# Patient Record
Sex: FEMALE | URBAN - METROPOLITAN AREA
[De-identification: names, ages, dates, MRNs, and addresses within clinical notes are randomized per-mention and may not be internally consistent; named-entity substitution may affect disease eponyms.]

---

## 2024-05-07 ENCOUNTER — INPATIENT (INPATIENT)
Facility: HOSPITAL | Age: 64
LOS: 1 days | Discharge: HOME HEALTH | End: 2024-05-09
Attending: ORTHOPAEDIC SURGERY | Admitting: ORTHOPAEDIC SURGERY
Payer: COMMERCIAL

## 2024-05-07 ENCOUNTER — TRANSCRIPTION ENCOUNTER (OUTPATIENT)
Age: 64
End: 2024-05-07

## 2024-05-07 VITALS
TEMPERATURE: 100 F | RESPIRATION RATE: 20 BRPM | WEIGHT: 100.97 LBS | OXYGEN SATURATION: 98 % | SYSTOLIC BLOOD PRESSURE: 101 MMHG | DIASTOLIC BLOOD PRESSURE: 72 MMHG | HEART RATE: 72 BPM

## 2024-05-07 LAB
ANION GAP SERPL CALC-SCNC: 11 MMOL/L — SIGNIFICANT CHANGE UP (ref 5–17)
ANISOCYTOSIS BLD QL: SIGNIFICANT CHANGE UP
APTT BLD: 33.7 SEC — SIGNIFICANT CHANGE UP (ref 24.5–35.6)
BASOPHILS # BLD AUTO: 0 K/UL — SIGNIFICANT CHANGE UP (ref 0–0.2)
BASOPHILS NFR BLD AUTO: 0 % — SIGNIFICANT CHANGE UP (ref 0–2)
BLD GP AB SCN SERPL QL: NEGATIVE — SIGNIFICANT CHANGE UP
BUN SERPL-MCNC: 18 MG/DL — SIGNIFICANT CHANGE UP (ref 7–23)
CALCIUM SERPL-MCNC: 9.4 MG/DL — SIGNIFICANT CHANGE UP (ref 8.4–10.5)
CHLORIDE SERPL-SCNC: 102 MMOL/L — SIGNIFICANT CHANGE UP (ref 96–108)
CO2 SERPL-SCNC: 24 MMOL/L — SIGNIFICANT CHANGE UP (ref 22–31)
CREAT SERPL-MCNC: 0.74 MG/DL — SIGNIFICANT CHANGE UP (ref 0.5–1.3)
DACRYOCYTES BLD QL SMEAR: SLIGHT — SIGNIFICANT CHANGE UP
EGFR: 90 ML/MIN/1.73M2 — SIGNIFICANT CHANGE UP
ELLIPTOCYTES BLD QL SMEAR: SIGNIFICANT CHANGE UP
EOSINOPHIL # BLD AUTO: 0 K/UL — SIGNIFICANT CHANGE UP (ref 0–0.5)
EOSINOPHIL NFR BLD AUTO: 0 % — SIGNIFICANT CHANGE UP (ref 0–6)
GLUCOSE SERPL-MCNC: 152 MG/DL — HIGH (ref 70–99)
HCT VFR BLD CALC: 37.2 % — SIGNIFICANT CHANGE UP (ref 34.5–45)
HGB BLD-MCNC: 11.7 G/DL — SIGNIFICANT CHANGE UP (ref 11.5–15.5)
HYPOCHROMIA BLD QL: SIGNIFICANT CHANGE UP
INR BLD: 0.96 — SIGNIFICANT CHANGE UP (ref 0.85–1.18)
LYMPHOCYTES # BLD AUTO: 1.6 K/UL — SIGNIFICANT CHANGE UP (ref 1–3.3)
LYMPHOCYTES # BLD AUTO: 9.4 % — LOW (ref 13–44)
MACROCYTES BLD QL: SLIGHT — SIGNIFICANT CHANGE UP
MANUAL SMEAR VERIFICATION: SIGNIFICANT CHANGE UP
MCHC RBC-ENTMCNC: 21.6 PG — LOW (ref 27–34)
MCHC RBC-ENTMCNC: 31.5 GM/DL — LOW (ref 32–36)
MCV RBC AUTO: 68.6 FL — LOW (ref 80–100)
MICROCYTES BLD QL: SLIGHT — SIGNIFICANT CHANGE UP
MONOCYTES # BLD AUTO: 0.15 K/UL — SIGNIFICANT CHANGE UP (ref 0–0.9)
MONOCYTES NFR BLD AUTO: 0.9 % — LOW (ref 2–14)
NEUTROPHILS # BLD AUTO: 15.23 K/UL — HIGH (ref 1.8–7.4)
NEUTROPHILS NFR BLD AUTO: 89.7 % — HIGH (ref 43–77)
OVALOCYTES BLD QL SMEAR: SLIGHT — SIGNIFICANT CHANGE UP
PLAT MORPH BLD: NORMAL — SIGNIFICANT CHANGE UP
PLATELET # BLD AUTO: 238 K/UL — SIGNIFICANT CHANGE UP (ref 150–400)
POIKILOCYTOSIS BLD QL AUTO: SIGNIFICANT CHANGE UP
POLYCHROMASIA BLD QL SMEAR: SIGNIFICANT CHANGE UP
POTASSIUM SERPL-MCNC: 4.1 MMOL/L — SIGNIFICANT CHANGE UP (ref 3.5–5.3)
POTASSIUM SERPL-SCNC: 4.1 MMOL/L — SIGNIFICANT CHANGE UP (ref 3.5–5.3)
PROTHROM AB SERPL-ACNC: 11 SEC — SIGNIFICANT CHANGE UP (ref 9.5–13)
RBC # BLD: 5.42 M/UL — HIGH (ref 3.8–5.2)
RBC # FLD: 16 % — HIGH (ref 10.3–14.5)
RBC BLD AUTO: ABNORMAL
RH IG SCN BLD-IMP: POSITIVE — SIGNIFICANT CHANGE UP
RH IG SCN BLD-IMP: POSITIVE — SIGNIFICANT CHANGE UP
SCHISTOCYTES BLD QL AUTO: SLIGHT — SIGNIFICANT CHANGE UP
SODIUM SERPL-SCNC: 137 MMOL/L — SIGNIFICANT CHANGE UP (ref 135–145)
WBC # BLD: 16.98 K/UL — HIGH (ref 3.8–10.5)
WBC # FLD AUTO: 16.98 K/UL — HIGH (ref 3.8–10.5)

## 2024-05-07 PROCEDURE — 99285 EMERGENCY DEPT VISIT HI MDM: CPT

## 2024-05-07 PROCEDURE — 93010 ELECTROCARDIOGRAM REPORT: CPT

## 2024-05-07 PROCEDURE — 71046 X-RAY EXAM CHEST 2 VIEWS: CPT | Mod: 26

## 2024-05-07 PROCEDURE — 72192 CT PELVIS W/O DYE: CPT | Mod: 26,MC

## 2024-05-07 PROCEDURE — 73502 X-RAY EXAM HIP UNI 2-3 VIEWS: CPT | Mod: 26,RT

## 2024-05-07 RX ORDER — SODIUM CHLORIDE 9 MG/ML
1000 INJECTION, SOLUTION INTRAVENOUS
Refills: 0 | Status: DISCONTINUED | OUTPATIENT
Start: 2024-05-07 | End: 2024-05-08

## 2024-05-07 RX ORDER — SENNA PLUS 8.6 MG/1
2 TABLET ORAL AT BEDTIME
Refills: 0 | Status: DISCONTINUED | OUTPATIENT
Start: 2024-05-07 | End: 2024-05-08

## 2024-05-07 RX ORDER — CHLORHEXIDINE GLUCONATE 213 G/1000ML
1 SOLUTION TOPICAL EVERY 12 HOURS
Refills: 0 | Status: DISCONTINUED | OUTPATIENT
Start: 2024-05-07 | End: 2024-05-08

## 2024-05-07 RX ORDER — POLYETHYLENE GLYCOL 3350 17 G/17G
17 POWDER, FOR SOLUTION ORAL AT BEDTIME
Refills: 0 | Status: DISCONTINUED | OUTPATIENT
Start: 2024-05-07 | End: 2024-05-08

## 2024-05-07 RX ORDER — OXYCODONE HYDROCHLORIDE 5 MG/1
5 TABLET ORAL
Refills: 0 | Status: DISCONTINUED | OUTPATIENT
Start: 2024-05-07 | End: 2024-05-08

## 2024-05-07 RX ORDER — ONDANSETRON 8 MG/1
4 TABLET, FILM COATED ORAL EVERY 6 HOURS
Refills: 0 | Status: DISCONTINUED | OUTPATIENT
Start: 2024-05-07 | End: 2024-05-08

## 2024-05-07 RX ORDER — POVIDONE-IODINE 5 %
1 AEROSOL (ML) TOPICAL ONCE
Refills: 0 | Status: COMPLETED | OUTPATIENT
Start: 2024-05-07 | End: 2024-05-08

## 2024-05-07 RX ORDER — MAGNESIUM HYDROXIDE 400 MG/1
30 TABLET, CHEWABLE ORAL DAILY
Refills: 0 | Status: DISCONTINUED | OUTPATIENT
Start: 2024-05-07 | End: 2024-05-08

## 2024-05-07 RX ORDER — TRAMADOL HYDROCHLORIDE 50 MG/1
50 TABLET ORAL EVERY 6 HOURS
Refills: 0 | Status: DISCONTINUED | OUTPATIENT
Start: 2024-05-07 | End: 2024-05-08

## 2024-05-07 RX ORDER — ACETAMINOPHEN 500 MG
650 TABLET ORAL EVERY 6 HOURS
Refills: 0 | Status: DISCONTINUED | OUTPATIENT
Start: 2024-05-07 | End: 2024-05-08

## 2024-05-07 RX ADMIN — SODIUM CHLORIDE 100 MILLILITER(S): 9 INJECTION, SOLUTION INTRAVENOUS at 22:50

## 2024-05-07 NOTE — ED ADULT NURSE REASSESSMENT NOTE - NS ED NURSE REASSESS COMMENT FT1
pt with abnormal CT/XR results. charge RN notified. pt moved to [facility] for ortho consult. report endorsed to ORA Bates

## 2024-05-07 NOTE — ED ADULT TRIAGE NOTE - CHIEF COMPLAINT QUOTE
Pt presents to ED c/o R hip pain and ankle pain [place]. Pt denies head injury, LOC, blood thinner use. Pt has history osteoporosis. Pt A&Ox4, NAD.

## 2024-05-07 NOTE — ED PROVIDER NOTE - OBJECTIVE STATEMENT
63-year-old female with past medical history of osteoporosis complaining of right hip pain after trip and fall around 3 PM today.  Patient was walking on the steps got to the last step and did not realize there was a small ledge twisted her right ankle and fell onto the right hip.  No LOC.  Initially patient could not get up but then with help was able to stand.  Since then the pain has been getting progressively worse and she is now unable to walk or bear weight.  Denies pain to the ankle or the knee.  Denies headache, neck pain, back pain.

## 2024-05-07 NOTE — ED ADULT NURSE NOTE - NSFALLRISKINTERV_ED_ALL_ED

## 2024-05-07 NOTE — ED ADULT NURSE REASSESSMENT NOTE - NS ED NURSE REASSESS COMMENT FT1
Pt received from previous RN, aox4, pain controlled at this time. Peripheral iv placed, labs drawn and send. Pending ortho consult.

## 2024-05-07 NOTE — H&P ADULT - HISTORY OF PRESENT ILLNESS
63yFemale with PMHx osteoporosis now s/p ground level fall with R  Hip pain.  Pt reports unable to bear weight on affected extremity afterwards. Denies any numbness/tingling. Denies any head trauma. Pt is a community ambulator at baseline, and uses nothing for assistance. Denies any antecedent groin pain

## 2024-05-07 NOTE — H&P ADULT - NSHPPHYSICALEXAM_GEN_ALL_CORE
General: AAOx3, NAD  R hip  Skin intact   Motor 5/5 TA/GS/EHL, Quad/Psoas Deferred 2/2 pain  SILT grossly SPN/DPN/Saph/Rafael/Tib  DP 2+

## 2024-05-07 NOTE — H&P ADULT - ASSESSMENT
63yFemale with R garden II FNF for R hip CRPP with Dr. Barnes  on 5/8/24   - Admit to orthopedic service  - NPO after MN for OR  - Pain Control  - DVT: SCDs, hold chemical ppx for OR  - Medical clearance/optimization  - pre-operative labs  - COVID  - Maintenance IVF  - F/u AM Labs  - Added on, consented    Tex Lipscomb, PGY-3  Orthopedic Surgery

## 2024-05-07 NOTE — ED PROVIDER NOTE - CLINICAL SUMMARY MEDICAL DECISION MAKING FREE TEXT BOX
63-year-old female with past medical history of osteoporosis complaining of right hip pain after trip and fall around 3 PM today.  Patient was walking on the steps got to the last step and did not realize there was a small ledge twisted her right ankle and fell onto the right hip.  No LOC.  Since then the pain has been getting progressively worse and she is now unable to walk or bear weight.  Denies pain to the ankle or the knee.  Denies headache, neck pain, back pain. R hip- no deformity, +Tenderness to posterior hip, unable to range at hip, unable to bear weight. +abrasion to R knee, no bony tenderness, fROM. No tenderness to R ankle 63-year-old female with past medical history of osteoporosis complaining of right hip pain after trip and fall around 3 PM today.  Patient was walking on the steps got to the last step and did not realize there was a small ledge twisted her right ankle and fell onto the right hip.  No LOC.  Since then the pain has been getting progressively worse and she is now unable to walk or bear weight.  Denies pain to the ankle or the knee.  Denies headache, neck pain, back pain. R hip- no deformity, +Tenderness to posterior hip, unable to range at hip, unable to bear weight. +abrasion to R knee, no bony tenderness, fROM. No tenderness to R ankle. XR/CT shows R subcapital femoral neck fx, ortho consulted

## 2024-05-07 NOTE — ED PROVIDER NOTE - PHYSICAL EXAMINATION
CONSTITUTIONAL: Well-appearing;  in no apparent distress.   HEAD: Normocephalic; atraumatic.   EYES: PERRL; EOM intact; conjunctiva and sclera clear  ENT: normal nose; no rhinorrhea; normal pharynx with no erythema or lesions.   NECK: Supple; non-tender; no LAD  CARDIOVASCULAR: Normal S1, S2; No audible murmurs. Regular rate and rhythm.   RESPIRATORY: Breathing easily;   MSK: R hip- no deformity, +Tenderness to posterior hip, unable to range at hip, unable to bear weight. +abrasion to R knee, no bony tenderness, fROM. No tenderness to R ankle   EXT: No cyanosis or edema; N/V intact  SKIN: Normal for age and race; warm; dry; good turgor; no apparent lesions or rash.   PSYCHOLOGICAL: The patient’s mood and manner are appropriate.

## 2024-05-08 LAB
ANION GAP SERPL CALC-SCNC: 7 MMOL/L — SIGNIFICANT CHANGE UP (ref 5–17)
APPEARANCE UR: CLEAR — SIGNIFICANT CHANGE UP
BACTERIA # UR AUTO: NEGATIVE /HPF — SIGNIFICANT CHANGE UP
BILIRUB UR-MCNC: NEGATIVE — SIGNIFICANT CHANGE UP
BUN SERPL-MCNC: 15 MG/DL — SIGNIFICANT CHANGE UP (ref 7–23)
CALCIUM SERPL-MCNC: 9 MG/DL — SIGNIFICANT CHANGE UP (ref 8.4–10.5)
CAST: 0 /LPF — SIGNIFICANT CHANGE UP (ref 0–4)
CHLORIDE SERPL-SCNC: 104 MMOL/L — SIGNIFICANT CHANGE UP (ref 96–108)
CO2 SERPL-SCNC: 25 MMOL/L — SIGNIFICANT CHANGE UP (ref 22–31)
COLOR SPEC: YELLOW — SIGNIFICANT CHANGE UP
CREAT SERPL-MCNC: 0.65 MG/DL — SIGNIFICANT CHANGE UP (ref 0.5–1.3)
DIFF PNL FLD: NEGATIVE — SIGNIFICANT CHANGE UP
EGFR: 98 ML/MIN/1.73M2 — SIGNIFICANT CHANGE UP
GLUCOSE SERPL-MCNC: 109 MG/DL — HIGH (ref 70–99)
GLUCOSE UR QL: NEGATIVE MG/DL — SIGNIFICANT CHANGE UP
HCT VFR BLD CALC: 30 % — LOW (ref 34.5–45)
HCT VFR BLD CALC: 32.8 % — LOW (ref 34.5–45)
HGB BLD-MCNC: 10.4 G/DL — LOW (ref 11.5–15.5)
HGB BLD-MCNC: 9.5 G/DL — LOW (ref 11.5–15.5)
KETONES UR-MCNC: ABNORMAL MG/DL
LEUKOCYTE ESTERASE UR-ACNC: ABNORMAL
MCHC RBC-ENTMCNC: 21.5 PG — LOW (ref 27–34)
MCHC RBC-ENTMCNC: 21.5 PG — LOW (ref 27–34)
MCHC RBC-ENTMCNC: 31.7 GM/DL — LOW (ref 32–36)
MCHC RBC-ENTMCNC: 31.7 GM/DL — LOW (ref 32–36)
MCV RBC AUTO: 67.9 FL — LOW (ref 80–100)
MCV RBC AUTO: 67.9 FL — LOW (ref 80–100)
NITRITE UR-MCNC: NEGATIVE — SIGNIFICANT CHANGE UP
NRBC # BLD: 0 /100 WBCS — SIGNIFICANT CHANGE UP (ref 0–0)
NRBC # BLD: 0 /100 WBCS — SIGNIFICANT CHANGE UP (ref 0–0)
PH UR: 6 — SIGNIFICANT CHANGE UP (ref 5–8)
PLATELET # BLD AUTO: 188 K/UL — SIGNIFICANT CHANGE UP (ref 150–400)
PLATELET # BLD AUTO: 213 K/UL — SIGNIFICANT CHANGE UP (ref 150–400)
POTASSIUM SERPL-MCNC: 3.9 MMOL/L — SIGNIFICANT CHANGE UP (ref 3.5–5.3)
POTASSIUM SERPL-SCNC: 3.9 MMOL/L — SIGNIFICANT CHANGE UP (ref 3.5–5.3)
PROT UR-MCNC: NEGATIVE MG/DL — SIGNIFICANT CHANGE UP
RAPID RVP RESULT: SIGNIFICANT CHANGE UP
RBC # BLD: 4.42 M/UL — SIGNIFICANT CHANGE UP (ref 3.8–5.2)
RBC # BLD: 4.83 M/UL — SIGNIFICANT CHANGE UP (ref 3.8–5.2)
RBC # FLD: 15.6 % — HIGH (ref 10.3–14.5)
RBC # FLD: 15.9 % — HIGH (ref 10.3–14.5)
RBC CASTS # UR COMP ASSIST: 3 /HPF — SIGNIFICANT CHANGE UP (ref 0–4)
SARS-COV-2 RNA SPEC QL NAA+PROBE: SIGNIFICANT CHANGE UP
SODIUM SERPL-SCNC: 136 MMOL/L — SIGNIFICANT CHANGE UP (ref 135–145)
SP GR SPEC: 1.02 — SIGNIFICANT CHANGE UP (ref 1–1.03)
SQUAMOUS # UR AUTO: 1 /HPF — SIGNIFICANT CHANGE UP (ref 0–5)
UROBILINOGEN FLD QL: 0.2 MG/DL — SIGNIFICANT CHANGE UP (ref 0.2–1)
WBC # BLD: 14.94 K/UL — HIGH (ref 3.8–10.5)
WBC # BLD: 9.81 K/UL — SIGNIFICANT CHANGE UP (ref 3.8–10.5)
WBC # FLD AUTO: 14.94 K/UL — HIGH (ref 3.8–10.5)
WBC # FLD AUTO: 9.81 K/UL — SIGNIFICANT CHANGE UP (ref 3.8–10.5)
WBC UR QL: 34 /HPF — HIGH (ref 0–5)

## 2024-05-08 PROCEDURE — 99255 IP/OBS CONSLTJ NEW/EST HI 80: CPT | Mod: GC

## 2024-05-08 DEVICE — SCREW CANN 6.5X85: Type: IMPLANTABLE DEVICE | Site: RIGHT | Status: FUNCTIONAL

## 2024-05-08 DEVICE — SCREW CANN ASNIS LLL 6.5X80MM: Type: IMPLANTABLE DEVICE | Site: RIGHT | Status: FUNCTIONAL

## 2024-05-08 DEVICE — SCREW ASNS 6.5X75 MM: Type: IMPLANTABLE DEVICE | Site: RIGHT | Status: FUNCTIONAL

## 2024-05-08 DEVICE — GWIRE ASNIS III THREADED 3.2X300MM: Type: IMPLANTABLE DEVICE | Site: RIGHT | Status: FUNCTIONAL

## 2024-05-08 RX ORDER — SODIUM CHLORIDE 9 MG/ML
1000 INJECTION, SOLUTION INTRAVENOUS
Refills: 0 | Status: DISCONTINUED | OUTPATIENT
Start: 2024-05-09 | End: 2024-05-09

## 2024-05-08 RX ORDER — ONDANSETRON 8 MG/1
4 TABLET, FILM COATED ORAL EVERY 6 HOURS
Refills: 0 | Status: DISCONTINUED | OUTPATIENT
Start: 2024-05-08 | End: 2024-05-09

## 2024-05-08 RX ORDER — INFLUENZA VIRUS VACCINE 15; 15; 15; 15 UG/.5ML; UG/.5ML; UG/.5ML; UG/.5ML
0.5 SUSPENSION INTRAMUSCULAR ONCE
Refills: 0 | Status: DISCONTINUED | OUTPATIENT
Start: 2024-05-08 | End: 2024-05-09

## 2024-05-08 RX ORDER — MAGNESIUM HYDROXIDE 400 MG/1
30 TABLET, CHEWABLE ORAL DAILY
Refills: 0 | Status: DISCONTINUED | OUTPATIENT
Start: 2024-05-08 | End: 2024-05-09

## 2024-05-08 RX ORDER — ACETAMINOPHEN 500 MG
1000 TABLET ORAL EVERY 8 HOURS
Refills: 0 | Status: DISCONTINUED | OUTPATIENT
Start: 2024-05-08 | End: 2024-05-09

## 2024-05-08 RX ORDER — OXYCODONE HYDROCHLORIDE 5 MG/1
5 TABLET ORAL
Refills: 0 | Status: DISCONTINUED | OUTPATIENT
Start: 2024-05-08 | End: 2024-05-09

## 2024-05-08 RX ORDER — HYDROMORPHONE HYDROCHLORIDE 2 MG/ML
0.5 INJECTION INTRAMUSCULAR; INTRAVENOUS; SUBCUTANEOUS EVERY 4 HOURS
Refills: 0 | Status: DISCONTINUED | OUTPATIENT
Start: 2024-05-08 | End: 2024-05-09

## 2024-05-08 RX ORDER — FOLIC ACID 0.8 MG
1 TABLET ORAL DAILY
Refills: 0 | Status: DISCONTINUED | OUTPATIENT
Start: 2024-05-08 | End: 2024-05-09

## 2024-05-08 RX ORDER — SENNA PLUS 8.6 MG/1
2 TABLET ORAL AT BEDTIME
Refills: 0 | Status: DISCONTINUED | OUTPATIENT
Start: 2024-05-08 | End: 2024-05-09

## 2024-05-08 RX ORDER — HYDROMORPHONE HYDROCHLORIDE 2 MG/ML
0.5 INJECTION INTRAMUSCULAR; INTRAVENOUS; SUBCUTANEOUS
Refills: 0 | Status: DISCONTINUED | OUTPATIENT
Start: 2024-05-08 | End: 2024-05-09

## 2024-05-08 RX ORDER — CEFAZOLIN SODIUM 1 G
2000 VIAL (EA) INJECTION EVERY 8 HOURS
Refills: 0 | Status: COMPLETED | OUTPATIENT
Start: 2024-05-09 | End: 2024-05-09

## 2024-05-08 RX ORDER — OXYCODONE HYDROCHLORIDE 5 MG/1
10 TABLET ORAL
Refills: 0 | Status: DISCONTINUED | OUTPATIENT
Start: 2024-05-08 | End: 2024-05-09

## 2024-05-08 RX ORDER — PANTOPRAZOLE SODIUM 20 MG/1
40 TABLET, DELAYED RELEASE ORAL
Refills: 0 | Status: DISCONTINUED | OUTPATIENT
Start: 2024-05-08 | End: 2024-05-09

## 2024-05-08 RX ORDER — POLYETHYLENE GLYCOL 3350 17 G/17G
17 POWDER, FOR SOLUTION ORAL AT BEDTIME
Refills: 0 | Status: DISCONTINUED | OUTPATIENT
Start: 2024-05-08 | End: 2024-05-09

## 2024-05-08 RX ADMIN — Medication 1 APPLICATION(S): at 14:40

## 2024-05-08 RX ADMIN — CHLORHEXIDINE GLUCONATE 1 APPLICATION(S): 213 SOLUTION TOPICAL at 08:01

## 2024-05-08 RX ADMIN — SODIUM CHLORIDE 100 MILLILITER(S): 9 INJECTION, SOLUTION INTRAVENOUS at 00:50

## 2024-05-08 RX ADMIN — Medication 650 MILLIGRAM(S): at 05:31

## 2024-05-08 RX ADMIN — POLYETHYLENE GLYCOL 3350 17 GRAM(S): 17 POWDER, FOR SOLUTION ORAL at 22:10

## 2024-05-08 RX ADMIN — Medication 650 MILLIGRAM(S): at 06:35

## 2024-05-08 RX ADMIN — Medication 1000 MILLIGRAM(S): at 22:10

## 2024-05-08 RX ADMIN — SODIUM CHLORIDE 100 MILLILITER(S): 9 INJECTION, SOLUTION INTRAVENOUS at 09:59

## 2024-05-08 RX ADMIN — SENNA PLUS 2 TABLET(S): 8.6 TABLET ORAL at 22:10

## 2024-05-08 NOTE — PATIENT PROFILE ADULT - FUNCTIONAL ASSESSMENT - BASIC MOBILITY 6.
2-calculated by average/Not able to assess (calculate score using [facility] averaging method)

## 2024-05-08 NOTE — PRE-OP CHECKLIST - SELECT TESTS ORDERED
BMP/CBC/PT/PTT/INR/Type and Screen/Urinalysis/EKG
BMP/CBC/CMP/PT/PTT/INR/Type and Cross/Type and Screen/Urinalysis/EKG/CXR/Results in MD note

## 2024-05-08 NOTE — CONSULT NOTE ADULT - ATTENDING COMMENTS
#Preop: RCRI 0, Class 1 risk. METs >4. EKG nsr, non iischemic. Pt is low risk for intermediate risk procedure. No further work up indicated.

## 2024-05-08 NOTE — CONSULT NOTE ADULT - ASSESSMENT
63yFemale with PMHx osteoporosis now s/p ground level fall with R  Hip pain found to have Acute right subcapital femoral neck fracture admitted to ortho for R hip Closed reduction with percutaneous pinning with Dr. Barnes on 5/8/24. Medicine consulted for pre-operative clearance    #pre-operative clearance  CT pelvis 5/7/24 showing Acute right subcapital femoral neck fracture. No significant cardiac hx.  Denies palpatations, chest pain, sob. No limitations with physical activity. No hx of asthma, COPD, AMY. No issues with ventilatory support in the past. No adverse reactions to anesthesia in the past in self or first degree relatives. No allergies to medications.No family or personal Hx of prolonged bleeding or unusual bruising. Pt has Hx of blood transfusion (non-surgical) w/ negative hx of blood transfusion reactions or allergies.      - Pre-op labs (CBC, CMP, PT, PTT, INR, T/S)  - EKG - NSR  - CXR - no acute cardiopulmonary disease process  - METS >4 (Can walk multiple city blocks without stopping due to SOB/ chest pain, climb > 1flight of stairs, does not use cane/ walker at baseline)  - RCRI - 0 points - Class I Risk - 3.9% 30-day risk of death, MI, or cardiac arrest   - Hsu score - 0.0%risk of MI or cardiac arrest, intraoperatively or up to 30 days post-op  - Patient deemed low risk for intermediate risk surgery      #Leukocytosis on admission  likely reactive from fall resulting in fracture  no signs or symptoms of infection  continue to trend    #osteoporosis  only home med alendronate 10mg daily   hold home med alendronate prior to surgery     63yFemale with PMHx osteoporosis now s/p ground level fall with R  Hip pain found to have Acute right subcapital femoral neck fracture admitted to ortho for R hip Closed reduction with percutaneous pinning with Dr. Barnes on 5/8/24. Medicine consulted for pre-operative clearance    #pre-operative clearance  CT pelvis 5/7/24 showing Acute right subcapital femoral neck fracture. No significant cardiac hx.  Denies palpatations, chest pain, sob. No limitations with physical activity. 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 No hx of asthma, COPD, AMY. Never been intubated in the past. No adverse reactions to anesthesia in the past in self or first degree relatives. No major surgeries. No allergies to medications. No family or personal Hx of prolonged bleeding or unusual bruising.     - Pre-op labs (CBC, CMP, PT, PTT, INR, T/S)  - EKG - NSR  - CXR - no acute cardiopulmonary disease process  - METS >4 (Can walk multiple city blocks without stopping due to SOB/ chest pain, climb > 1flight of stairs, does not use cane/ walker at baseline)  - RCRI - 0 points - Class I Risk - 3.9% 30-day risk of death, MI, or cardiac arrest   - Hsu score - 0.0%risk of MI or cardiac arrest, intraoperatively or up to 30 days post-op  - Patient deemed low risk for intermediate risk surgery      #Leukocytosis on admission  likely reactive from fall resulting in fracture vs recovering from URI  patient has improving non productive cough. Patient states she is recovering from a cold. Denies any fevers, chills, n/v, headaches, sob, chest pain, palpitations, abdominal pain, constipation, diarrhea.   No other signs or symptoms of infection  continue to trend  recommend RVP    #osteoporosis  only home med alendronate 10mg daily   hold home med alendronate prior to surgery     63yFemale with PMHx osteoporosis now s/p ground level fall with R  Hip pain found to have Acute right subcapital femoral neck fracture admitted to ortho for R hip Closed reduction with percutaneous pinning with Dr. Barnes on 5/8/24. Medicine consulted for pre-operative clearance    #pre-operative clearance  CT pelvis 5/7/24 showing Acute right subcapital femoral neck fracture. No significant cardiac hx.  Denies palpatations, chest pain, sob. No limitations with physical activity. 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 No hx of asthma, COPD, AMY. Never been intubated in the past. No adverse reactions to anesthesia in the past in self or first degree relatives. No major surgeries. No allergies to medications. No family or personal Hx of prolonged bleeding or unusual bruising.     - Pre-op labs (CBC, CMP, PT, PTT, INR, T/S)  - EKG - NSR, nonischemic, qtc 462  - CXR - no acute cardiopulmonary disease process  - METS >4 (Can walk multiple city blocks without stopping due to SOB/ chest pain, climb > 1flight of stairs, does not use cane/ walker at baseline)  - RCRI - 0 points - Class I Risk - 3.9% 30-day risk of death, MI, or cardiac arrest   - Shu score - 0.0%risk of MI or cardiac arrest, intraoperatively or up to 30 days post-op  - Patient deemed low risk for intermediate risk surgery      #Leukocytosis on admission  likely reactive from fall resulting in fracture vs recovering from URI  patient has improving non productive cough. Patient states she is recovering from a cold. Denies any fevers, chills, n/v, headaches, sob, chest pain, palpitations, abdominal pain, constipation, diarrhea.   No other signs or symptoms of infection  continue to trend  recommend RVP    #osteoporosis  only home med alendronate 10mg daily   hold home med alendronate prior to surgery     63yFemale with PMHx osteoporosis now s/p ground level fall with R  Hip pain found to have Acute right subcapital femoral neck fracture admitted to ortho for R hip Closed reduction with percutaneous pinning with Dr. Barnes on 5/8/24. Medicine consulted for pre-operative clearance    #pre-operative clearance  CT pelvis 5/7/24 showing Acute right subcapital femoral neck fracture. No significant cardiac hx.  Denies palpatations, chest pain, sob. No limitations with physical activity. 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 No hx of asthma, COPD, AMY. Never been intubated in the past. No adverse reactions to anesthesia in the past in self or first degree relatives. No major surgeries. No allergies to medications. No family or personal Hx of prolonged bleeding or unusual bruising. Denies smoking, alcohol, recreational drug use.    - Pre-op labs (CBC, CMP, PT, PTT, INR, T/S)  - EKG - NSR, nonischemic, qtc 462  - CXR - no acute cardiopulmonary disease process  - METS >4 (Can walk multiple city blocks without stopping due to SOB/ chest pain, climb > 1flight of stairs, does not use cane/ walker at baseline)  - RCRI - 0 points - Class I Risk - 3.9% 30-day risk of death, MI, or cardiac arrest   - Hsu score - 0.0%risk of MI or cardiac arrest, intraoperatively or up to 30 days post-op  - Patient deemed low risk for intermediate risk surgery      #Leukocytosis on admission  likely reactive from fall resulting in fracture vs recovering from URI  patient has improving non productive cough. Patient states she is recovering from a cold. Denies any fevers, chills, n/v, headaches, sob, chest pain, palpitations, abdominal pain, constipation, diarrhea.   No other signs or symptoms of infection  continue to trend  recommend RVP    #osteoporosis  only home med alendronate 10mg daily   resume home med alendronate after to surgery

## 2024-05-08 NOTE — CONSULT NOTE ADULT - SUBJECTIVE AND OBJECTIVE BOX
INCOMPLETE  NINFA FERNANDO      Per Ortho HPI, " 63yFemale with PMHx osteoporosis now s/p ground level fall with R  Hip pain.  Pt reports unable to bear weight on affected extremity afterwards. Denies any numbness/tingling. Denies any head trauma. Pt is a community ambulator at baseline, and uses nothing for assistance. Denies any antecedent groin pain"    Medicine consulted for pre-operative clearance for R hip Closed reduction with percutaneous pinning with Dr. Barnes  on 5/8/24      PAST MEDICAL/SURGICAL HISTORY  PAST MEDICAL & SURGICAL HISTORY:      REVIEW OF SYSTEMS:  CONSTITUTIONAL: No fever, weight loss, or fatigue  EYES: No eye pain, visual disturbances, or discharge  ENMT:  No difficulty hearing, tinnitus, vertigo; No sinus or throat pain  NECK: No pain or stiffness  BREASTS: No pain, masses, or nipple discharge  RESPIRATORY: No cough, wheezing, chills or hemoptysis; No shortness of breath  CARDIOVASCULAR: No chest pain, palpitations, dizziness, or leg swelling  GASTROINTESTINAL: No abdominal or epigastric pain. No nausea, vomiting, or hematemesis; No diarrhea or constipation. No melena or hematochezia.  GENITOURINARY: No dysuria, frequency, hematuria, or incontinence  NEUROLOGICAL: No headaches, memory loss, loss of strength, numbness, or tremors  SKIN: No itching, burning, rashes, or lesions   LYMPH NODES: No enlarged glands  ENDOCRINE: No heat or cold intolerance; No hair loss  MUSCULOSKELETAL: No joint pain or swelling; No muscle, back, or extremity pain  PSYCHIATRIC: No depression, anxiety, mood swings, or difficulty sleeping  HEME/LYMPH: No easy bruising, or bleeding gums  ALLERY AND IMMUNOLOGIC: No hives or eczema    T(C): 37.1 (05-07-24 @ 23:45), Max: 37.9 (05-07-24 @ 19:02)  HR: 80 (05-07-24 @ 23:45) (72 - 80)  BP: 100/59 (05-07-24 @ 23:45) (100/59 - 101/72)  RR: 16 (05-07-24 @ 23:45) (16 - 20)  SpO2: 95% (05-07-24 @ 23:45) (95% - 98%)  Wt(kg): --Vital Signs Last 24 Hrs  T(C): 37.1 (07 May 2024 23:45), Max: 37.9 (07 May 2024 19:02)  T(F): 98.7 (07 May 2024 23:45), Max: 100.2 (07 May 2024 19:02)  HR: 80 (07 May 2024 23:45) (72 - 80)  BP: 100/59 (07 May 2024 23:45) (100/59 - 101/72)  BP(mean): --  RR: 16 (07 May 2024 23:45) (16 - 20)  SpO2: 95% (07 May 2024 23:45) (95% - 98%)    Parameters below as of 07 May 2024 23:45  Patient On (Oxygen Delivery Method): room air        PHYSICAL EXAM:  GENERAL: NAD, well-groomed, well-developed  HEAD:  Atraumatic, Normocephalic  EYES: EOMI, PERRLA, conjunctiva and sclera clear  ENMT: No tonsillar erythema, exudates, or enlargement; Moist mucous membranes, Good dentition, No lesions  NECK: Supple, No JVD, Normal thyroid  NERVOUS SYSTEM:  Alert & Oriented X3, Good concentration; Motor Strength 5/5 B/L upper and lower extremities; DTRs 2+ intact and symmetric  CHEST/LUNG: Clear to percussion bilaterally; No rales, rhonchi, wheezing, or rubs  HEART: Regular rate and rhythm; No murmurs, rubs, or gallops  ABDOMEN: Soft, Nontender, Nondistended; Bowel sounds present  EXTREMITIES:  2+ Peripheral Pulses, No clubbing, cyanosis, or edema  LYMPH: No lymphadenopathy noted  SKIN: No rashes or lesions    Consultant(s) Notes Reviewed:  [x ] YES  [ ] NO  Care Discussed with Consultants/Other Providers [ x] YES  [ ] NO    LABS:  CBC   05-07-24 @ 20:50  Hematcorit 37.2  Hemoglobin 11.7  Mean Cell Hemoglobin 21.6  Platelet Count-Automated 238  RBC Count 5.42  Red Cell Distrib Width 16.0  Wbc Count 16.98      BMP  05-07-24 @ 20:50  Anion Gap. Serum 11  Blood Urea Nitrogen,Serm 18  Calcium, Total Serum 9.4  Carbon Dioxide, Serum 24  Chloride, Serum 102  Creatinine, Serum 0.74  eGFR in  --  eGFR in Non Afican American --  Gloucose, serum 152  Potassium, Serum 4.1  Sodium, Serum 137                  CMP  05-07-24 @ 20:50  Tammie Aminotransferase(ALT/SGPT)--  Albumin, Serum --  Alkaline Phosphatase, Serum --  Anion Gap, Serum 11  Aspartate Aminotransferase (AST/SGOT)--  Bilirubin Total, Serum --  Blood Urea Nitrogen, Serum 18  Calcium,Total Serum 9.4  Carbon Dioxide, Serum 24  Chloride, Serum 102  Creatinine, Serum 0.74  eGFR if  --  eGFR if Non African American --  Glucose, Serum 152  Potassium, Serum 4.1  Protein Total, Serum --  Sodium, Serum 137                          PT/INR  PT/INR  05-07-24 @ 20:50  INR 0.96  Prothrombin Time Comment --  Prothrobin Time, [id number]      Amylase/Lipase            RADIOLOGY & ADDITIONAL TESTS:    Imaging Personally Reviewed:  [ ] YES  [ ] NO INCOMPLETE  NINFA FERNANDO      Per Ortho HPI, " 63yFemale with PMHx osteoporosis now s/p ground level fall with R  Hip pain.  Pt reports unable to bear weight on affected extremity afterwards. Denies any numbness/tingling. Denies any head trauma. Pt is a community ambulator at baseline, and uses nothing for assistance. Denies any antecedent groin pain"    Medicine consulted for pre-operative clearance for R hip Closed reduction with percutaneous pinning with Dr. Barnes  on 5/8/24. Patient states she presented to the ED with R hip pain. [place] subway [place] got [place] the last step and did not realize there was a small ledge twisted her right ankle and fell onto the right hip.  No LOC or head trauma. Initially patient could not get up but then with help was able to stand.  Since then the pain has been getting progressively worse and she is now unable to walk or bear weight. Patient also states she is recovering from a cold, and has a nonproductive cough which is improving. Denies any fevers, chills, n/v, headaches, sob, chest pain, palpitations, abdominal pain, constipation, diarrhea. 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 Ambulates independently. Only home med is bisphosphonate. Only home med is alendronate       PAST MEDICAL/SURGICAL HISTORY  PAST MEDICAL & SURGICAL HISTORY:      REVIEW OF SYSTEMS:  CONSTITUTIONAL: No fever, weight loss, or fatigue  EYES: No eye pain, visual disturbances, or discharge  ENMT:  No difficulty hearing, tinnitus, vertigo; No sinus or throat pain  NECK: No pain or stiffness  BREASTS: No pain, masses, or nipple discharge  RESPIRATORY: No cough, wheezing, chills or hemoptysis; No shortness of breath  CARDIOVASCULAR: No chest pain, palpitations, dizziness, or leg swelling  GASTROINTESTINAL: No abdominal or epigastric pain. No nausea, vomiting, or hematemesis; No diarrhea or constipation. No melena or hematochezia.  GENITOURINARY: No dysuria, frequency, hematuria, or incontinence  NEUROLOGICAL: No headaches, memory loss, loss of strength, numbness, or tremors  SKIN: No itching, burning, rashes, or lesions   LYMPH NODES: No enlarged glands  ENDOCRINE: No heat or cold intolerance; No hair loss  MUSCULOSKELETAL: R hip pain  PSYCHIATRIC: No depression, anxiety, mood swings, or difficulty sleeping  HEME/LYMPH: No easy bruising, or bleeding gums  ALLERY AND IMMUNOLOGIC: No hives or eczema    T(C): 37.1 (05-07-24 @ 23:45), Max: 37.9 (05-07-24 @ 19:02)  HR: 80 (05-07-24 @ 23:45) (72 - 80)  BP: 100/59 (05-07-24 @ 23:45) (100/59 - 101/72)  RR: 16 (05-07-24 @ 23:45) (16 - 20)  SpO2: 95% (05-07-24 @ 23:45) (95% - 98%)  Wt(kg): --Vital Signs Last 24 Hrs  T(C): 37.1 (07 May 2024 23:45), Max: 37.9 (07 May 2024 19:02)  T(F): 98.7 (07 May 2024 23:45), Max: 100.2 (07 May 2024 19:02)  HR: 80 (07 May 2024 23:45) (72 - 80)  BP: 100/59 (07 May 2024 23:45) (100/59 - 101/72)  BP(mean): --  RR: 16 (07 May 2024 23:45) (16 - 20)  SpO2: 95% (07 May 2024 23:45) (95% - 98%)    Parameters below as of 07 May 2024 23:45  Patient On (Oxygen Delivery Method): room air        PHYSICAL EXAM:  GENERAL: NAD, well-groomed, well-developed  HEAD:  Atraumatic, Normocephalic  EYES: EOMI, PERRLA, conjunctiva and sclera clear  ENMT: No tonsillar erythema, exudates, or enlargement; Moist mucous membranes   NERVOUS SYSTEM:  Alert & Oriented X3, Good concentration;   CHEST/LUNG: no increased WOB  HEART: Regular rate and rhythm; No murmurs, rubs, or gallops  ABDOMEN: Soft, Nontender, Nondistended; Bowel sounds present  EXTREMITIES:  2+ Peripheral Pulses, No clubbing, cyanosis, or edema,  +Tenderness to posterior hip, unable to assess ROM due to pain. +abrasion to R knee, L knee full ROM  SKIN: No rashes or lesions    Consultant(s) Notes Reviewed:  [x ] YES  [ ] NO  Care Discussed with Consultants/Other Providers [ x] YES  [ ] NO    LABS:  CBC   05-07-24 @ 20:50  Hematcorit 37.2  Hemoglobin 11.7  Mean Cell Hemoglobin 21.6  Platelet Count-Automated 238  RBC Count 5.42  Red Cell Distrib Width 16.0  Wbc Count 16.98      BMP  05-07-24 @ 20:50  Anion Gap. Serum 11  Blood Urea Nitrogen,Serm 18  Calcium, Total Serum 9.4  Carbon Dioxide, Serum 24  Chloride, Serum 102  Creatinine, Serum 0.74  eGFR in  --  eGFR in Non Afican American --  Gloucose, serum 152  Potassium, Serum 4.1  Sodium, Serum 137                  CMP  05-07-24 @ 20:50  Tammie Aminotransferase(ALT/SGPT)--  Albumin, Serum --  Alkaline Phosphatase, Serum --  Anion Gap, Serum 11  Aspartate Aminotransferase (AST/SGOT)--  Bilirubin Total, Serum --  Blood Urea Nitrogen, Serum 18  Calcium,Total Serum 9.4  Carbon Dioxide, Serum 24  Chloride, Serum 102  Creatinine, Serum 0.74  eGFR if  --  eGFR if Non African American --  Glucose, Serum 152  Potassium, Serum 4.1  Protein Total, Serum --  Sodium, Serum 137                          PT/INR  PT/INR  05-07-24 @ 20:50  INR 0.96  Prothrombin Time Comment --  Prothrobin Time, [id number]      Amylase/Lipase            RADIOLOGY & ADDITIONAL TESTS:    Imaging Personally Reviewed:  [ ] YES  [ ] NO NINFA FERNANDO      Per Ortho HPI, " 63yFemale with PMHx osteoporosis now s/p ground level fall with R  Hip pain.  Pt reports unable to bear weight on affected extremity afterwards. Denies any numbness/tingling. Denies any head trauma. Pt is a community ambulator at baseline, and uses nothing for assistance. Denies any antecedent groin pain"    Medicine consulted for pre-operative clearance for R hip Closed reduction with percutaneous pinning with Dr. Barnes  on 5/8/24. Patient states she presented to the ED with R hip pain. [place] the subway [place] got to the last step and did not realize there was a small ledge twisted her right ankle and fell onto the right hip.  No LOC or head trauma. Initially patient could not get up but then with help was able to stand.  Since then the pain has been getting progressively worse and she is now unable to walk or bear weight. Patient also states she is recovering from a cold, and has a nonproductive cough which is improving. Denies any fevers, chills, n/v, headaches, sob, chest pain, palpitations, abdominal pain, constipation, diarrhea. 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 Ambulates independently. Only home med is bisphosphonate. Only home med is alendronate       PAST MEDICAL/SURGICAL HISTORY  PAST MEDICAL & SURGICAL HISTORY:      REVIEW OF SYSTEMS:  CONSTITUTIONAL: No fever, weight loss, or fatigue  EYES: No eye pain, visual disturbances, or discharge  ENMT:  No difficulty hearing, tinnitus, vertigo; No sinus or throat pain  NECK: No pain or stiffness  BREASTS: No pain, masses, or nipple discharge  RESPIRATORY: No cough, wheezing, chills or hemoptysis; No shortness of breath  CARDIOVASCULAR: No chest pain, palpitations, dizziness, or leg swelling  GASTROINTESTINAL: No abdominal or epigastric pain. No nausea, vomiting, or hematemesis; No diarrhea or constipation. No melena or hematochezia.  GENITOURINARY: No dysuria, frequency, hematuria, or incontinence  NEUROLOGICAL: No headaches, memory loss, loss of strength, numbness, or tremors  SKIN: No itching, burning, rashes, or lesions   LYMPH NODES: No enlarged glands  ENDOCRINE: No heat or cold intolerance; No hair loss  MUSCULOSKELETAL: R hip pain  PSYCHIATRIC: No depression, anxiety, mood swings, or difficulty sleeping  HEME/LYMPH: No easy bruising, or bleeding gums  ALLERY AND IMMUNOLOGIC: No hives or eczema    T(C): 37.1 (05-07-24 @ 23:45), Max: 37.9 (05-07-24 @ 19:02)  HR: 80 (05-07-24 @ 23:45) (72 - 80)  BP: 100/59 (05-07-24 @ 23:45) (100/59 - 101/72)  RR: 16 (05-07-24 @ 23:45) (16 - 20)  SpO2: 95% (05-07-24 @ 23:45) (95% - 98%)  Wt(kg): --Vital Signs Last 24 Hrs  T(C): 37.1 (07 May 2024 23:45), Max: 37.9 (07 May 2024 19:02)  T(F): 98.7 (07 May 2024 23:45), Max: 100.2 (07 May 2024 19:02)  HR: 80 (07 May 2024 23:45) (72 - 80)  BP: 100/59 (07 May 2024 23:45) (100/59 - 101/72)  BP(mean): --  RR: 16 (07 May 2024 23:45) (16 - 20)  SpO2: 95% (07 May 2024 23:45) (95% - 98%)    Parameters below as of 07 May 2024 23:45  Patient On (Oxygen Delivery Method): room air        PHYSICAL EXAM:  GENERAL: NAD, well-groomed, well-developed  HEAD:  Atraumatic, Normocephalic  EYES: EOMI, PERRLA, conjunctiva and sclera clear  ENMT: No tonsillar erythema, exudates, or enlargement; Moist mucous membranes   NERVOUS SYSTEM:  Alert & Oriented X3, Good concentration;   CHEST/LUNG: no increased WOB  HEART: Regular rate and rhythm; No murmurs, rubs, or gallops  ABDOMEN: Soft, Nontender, Nondistended; Bowel sounds present  EXTREMITIES:  2+ Peripheral Pulses, No clubbing, cyanosis, or edema,  +Tenderness to posterior hip, unable to assess ROM due to pain. +abrasion to R knee, L knee full ROM  SKIN: No rashes or lesions    Consultant(s) Notes Reviewed:  [x ] YES  [ ] NO  Care Discussed with Consultants/Other Providers [ x] YES  [ ] NO    LABS:  CBC   05-07-24 @ 20:50  Hematcorit 37.2  Hemoglobin 11.7  Mean Cell Hemoglobin 21.6  Platelet Count-Automated 238  RBC Count 5.42  Red Cell Distrib Width 16.0  Wbc Count 16.98      BMP  05-07-24 @ 20:50  Anion Gap. Serum 11  Blood Urea Nitrogen,Serm 18  Calcium, Total Serum 9.4  Carbon Dioxide, Serum 24  Chloride, Serum 102  Creatinine, Serum 0.74  eGFR in  --  eGFR in Non Afican American --  Gloucose, serum 152  Potassium, Serum 4.1  Sodium, Serum 137                  CMP  05-07-24 @ 20:50  Tammie Aminotransferase(ALT/SGPT)--  Albumin, Serum --  Alkaline Phosphatase, Serum --  Anion Gap, Serum 11  Aspartate Aminotransferase (AST/SGOT)--  Bilirubin Total, Serum --  Blood Urea Nitrogen, Serum 18  Calcium,Total Serum 9.4  Carbon Dioxide, Serum 24  Chloride, Serum 102  Creatinine, Serum 0.74  eGFR if  --  eGFR if Non African American --  Glucose, Serum 152  Potassium, Serum 4.1  Protein Total, Serum --  Sodium, Serum 137                          PT/INR  PT/INR  05-07-24 @ 20:50  INR 0.96  Prothrombin Time Comment --  Prothrobin Time, [id number]      Amylase/Lipase            RADIOLOGY & ADDITIONAL TESTS:    Imaging Personally Reviewed:  [ ] YES  [ ] NO

## 2024-05-09 ENCOUNTER — NON-APPOINTMENT (OUTPATIENT)
Age: 64
End: 2024-05-09

## 2024-05-09 ENCOUNTER — TRANSCRIPTION ENCOUNTER (OUTPATIENT)
Age: 64
End: 2024-05-09

## 2024-05-09 VITALS
HEART RATE: 76 BPM | OXYGEN SATURATION: 97 % | RESPIRATION RATE: 16 BRPM | SYSTOLIC BLOOD PRESSURE: 92 MMHG | DIASTOLIC BLOOD PRESSURE: 59 MMHG | TEMPERATURE: 98 F

## 2024-05-09 LAB
ANION GAP SERPL CALC-SCNC: 10 MMOL/L — SIGNIFICANT CHANGE UP (ref 5–17)
BUN SERPL-MCNC: 11 MG/DL — SIGNIFICANT CHANGE UP (ref 7–23)
CALCIUM SERPL-MCNC: 8.2 MG/DL — LOW (ref 8.4–10.5)
CHLORIDE SERPL-SCNC: 106 MMOL/L — SIGNIFICANT CHANGE UP (ref 96–108)
CO2 SERPL-SCNC: 23 MMOL/L — SIGNIFICANT CHANGE UP (ref 22–31)
CREAT SERPL-MCNC: 0.58 MG/DL — SIGNIFICANT CHANGE UP (ref 0.5–1.3)
CULTURE RESULTS: SIGNIFICANT CHANGE UP
EGFR: 101 ML/MIN/1.73M2 — SIGNIFICANT CHANGE UP
GLUCOSE SERPL-MCNC: 109 MG/DL — HIGH (ref 70–99)
HCT VFR BLD CALC: 25.8 % — LOW (ref 34.5–45)
HGB BLD-MCNC: 8.4 G/DL — LOW (ref 11.5–15.5)
MCHC RBC-ENTMCNC: 21.7 PG — LOW (ref 27–34)
MCHC RBC-ENTMCNC: 32.6 GM/DL — SIGNIFICANT CHANGE UP (ref 32–36)
MCV RBC AUTO: 66.7 FL — LOW (ref 80–100)
NRBC # BLD: 0 /100 WBCS — SIGNIFICANT CHANGE UP (ref 0–0)
PLATELET # BLD AUTO: 187 K/UL — SIGNIFICANT CHANGE UP (ref 150–400)
POTASSIUM SERPL-MCNC: 3.8 MMOL/L — SIGNIFICANT CHANGE UP (ref 3.5–5.3)
POTASSIUM SERPL-SCNC: 3.8 MMOL/L — SIGNIFICANT CHANGE UP (ref 3.5–5.3)
RBC # BLD: 3.87 M/UL — SIGNIFICANT CHANGE UP (ref 3.8–5.2)
RBC # FLD: 16 % — HIGH (ref 10.3–14.5)
SODIUM SERPL-SCNC: 139 MMOL/L — SIGNIFICANT CHANGE UP (ref 135–145)
SPECIMEN SOURCE: SIGNIFICANT CHANGE UP
WBC # BLD: 12.64 K/UL — HIGH (ref 3.8–10.5)
WBC # FLD AUTO: 12.64 K/UL — HIGH (ref 3.8–10.5)

## 2024-05-09 PROCEDURE — 86901 BLOOD TYPING SEROLOGIC RH(D): CPT

## 2024-05-09 PROCEDURE — 36415 COLL VENOUS BLD VENIPUNCTURE: CPT

## 2024-05-09 PROCEDURE — 97161 PT EVAL LOW COMPLEX 20 MIN: CPT

## 2024-05-09 PROCEDURE — 85025 COMPLETE CBC W/AUTO DIFF WBC: CPT

## 2024-05-09 PROCEDURE — 86900 BLOOD TYPING SEROLOGIC ABO: CPT

## 2024-05-09 PROCEDURE — 86850 RBC ANTIBODY SCREEN: CPT

## 2024-05-09 PROCEDURE — 93005 ELECTROCARDIOGRAM TRACING: CPT

## 2024-05-09 PROCEDURE — 85027 COMPLETE CBC AUTOMATED: CPT

## 2024-05-09 PROCEDURE — 80048 BASIC METABOLIC PNL TOTAL CA: CPT

## 2024-05-09 PROCEDURE — 73502 X-RAY EXAM HIP UNI 2-3 VIEWS: CPT

## 2024-05-09 PROCEDURE — C1713: CPT

## 2024-05-09 PROCEDURE — 85730 THROMBOPLASTIN TIME PARTIAL: CPT

## 2024-05-09 PROCEDURE — 87086 URINE CULTURE/COLONY COUNT: CPT

## 2024-05-09 PROCEDURE — 72192 CT PELVIS W/O DYE: CPT | Mod: MC

## 2024-05-09 PROCEDURE — 85610 PROTHROMBIN TIME: CPT

## 2024-05-09 PROCEDURE — 81001 URINALYSIS AUTO W/SCOPE: CPT

## 2024-05-09 PROCEDURE — 99233 SBSQ HOSP IP/OBS HIGH 50: CPT

## 2024-05-09 PROCEDURE — 0225U NFCT DS DNA&RNA 21 SARSCOV2: CPT

## 2024-05-09 PROCEDURE — 76000 FLUOROSCOPY <1 HR PHYS/QHP: CPT

## 2024-05-09 PROCEDURE — 71046 X-RAY EXAM CHEST 2 VIEWS: CPT

## 2024-05-09 PROCEDURE — 97165 OT EVAL LOW COMPLEX 30 MIN: CPT

## 2024-05-09 PROCEDURE — 99285 EMERGENCY DEPT VISIT HI MDM: CPT

## 2024-05-09 RX ORDER — POLYETHYLENE GLYCOL 3350 17 G/17G
17 POWDER, FOR SOLUTION ORAL
Qty: 0 | Refills: 0 | DISCHARGE
Start: 2024-05-09

## 2024-05-09 RX ORDER — ENOXAPARIN SODIUM 100 MG/ML
40 INJECTION SUBCUTANEOUS EVERY 24 HOURS
Refills: 0 | Status: DISCONTINUED | OUTPATIENT
Start: 2024-05-09 | End: 2024-05-09

## 2024-05-09 RX ORDER — PANTOPRAZOLE SODIUM 20 MG/1
1 TABLET, DELAYED RELEASE ORAL
Qty: 30 | Refills: 0
Start: 2024-05-09 | End: 2024-06-07

## 2024-05-09 RX ORDER — ASPIRIN/CALCIUM CARB/MAGNESIUM 324 MG
1 TABLET ORAL
Qty: 60 | Refills: 0
Start: 2024-05-09 | End: 2024-06-07

## 2024-05-09 RX ORDER — ASPIRIN/CALCIUM CARB/MAGNESIUM 324 MG
81 TABLET ORAL
Refills: 0 | Status: DISCONTINUED | OUTPATIENT
Start: 2024-05-09 | End: 2024-05-09

## 2024-05-09 RX ORDER — ACETAMINOPHEN 500 MG
2 TABLET ORAL
Qty: 0 | Refills: 0 | DISCHARGE
Start: 2024-05-09

## 2024-05-09 RX ORDER — TRAMADOL HYDROCHLORIDE 50 MG/1
50 TABLET ORAL EVERY 4 HOURS
Refills: 0 | Status: DISCONTINUED | OUTPATIENT
Start: 2024-05-09 | End: 2024-05-09

## 2024-05-09 RX ORDER — TRAMADOL HYDROCHLORIDE 50 MG/1
25 TABLET ORAL EVERY 4 HOURS
Refills: 0 | Status: DISCONTINUED | OUTPATIENT
Start: 2024-05-09 | End: 2024-05-09

## 2024-05-09 RX ORDER — TRAMADOL HYDROCHLORIDE 50 MG/1
1 TABLET ORAL
Qty: 20 | Refills: 0
Start: 2024-05-09

## 2024-05-09 RX ADMIN — Medication 1000 MILLIGRAM(S): at 06:32

## 2024-05-09 RX ADMIN — PANTOPRAZOLE SODIUM 40 MILLIGRAM(S): 20 TABLET, DELAYED RELEASE ORAL at 06:32

## 2024-05-09 RX ADMIN — Medication 100 MILLIGRAM(S): at 00:08

## 2024-05-09 RX ADMIN — Medication 1 TABLET(S): at 12:18

## 2024-05-09 RX ADMIN — Medication 1 MILLIGRAM(S): at 12:18

## 2024-05-09 RX ADMIN — Medication 100 MILLIGRAM(S): at 07:35

## 2024-05-09 NOTE — DISCHARGE NOTE NURSING/CASE MANAGEMENT/SOCIAL WORK - PATIENT PORTAL LINK FT
You can access the FollowMyHealth Patient Portal offered by [facility] by registering at the following website: [email or web address]. By joining [facility]’s FollowMyHealth portal, you will also be able to view your health information using other applications (apps) compatible with our system.

## 2024-05-09 NOTE — DISCHARGE NOTE PROVIDER - HOSPITAL COURSE
Admitted on 5/7 with right femoral neck fracture   Attending: Dr. Barnes   Surgery: Right hip closed reduction percutaneous pinning 5/8  Colleen-op Antibiotics  Pain control  DVT prophylaxis  OOB/Physical Therapy/Occupational Therapy   Medicine Consult    It is recommended you follow up with Dr. Lawrence - Endocrinology for further management of osteoporosis after discharge. Her office will reach out to you to schedule, if you do not hear from them, please call the number provided. You may continue alendronate upon discharge until otherwise recommended.

## 2024-05-09 NOTE — PROGRESS NOTE ADULT - SUBJECTIVE AND OBJECTIVE BOX
Ortho Note    Surgery: s/p Right hip CRPP POD #1    Patient seen and examined this AM   Pt comfortable without complaints, pain controlled  Denies HA, dizziness, CP, SOB, N/V, numbness/tingling     Vital Signs Last 24 Hrs  T(C): 36.4 (05-09-24 @ 09:32), Max: 36.4 (05-09-24 @ 09:32)  T(F): 97.6 (05-09-24 @ 09:32), Max: 97.6 (05-09-24 @ 09:32)  HR: 80 (05-09-24 @ 11:55) (80 - 84)  BP: 96/58 (05-09-24 @ 12:49) (96/58 - 109/55)  BP(mean): --  RR: 17 (05-09-24 @ 09:32) (17 - 17)  SpO2: 98% (05-09-24 @ 11:55) (97% - 100%)  AVSS    General: Pt Alert and oriented, NAD  Dressing C/D/I: aquacel right hip C/D/I in place  bilateral LE warm and well perfused   Sensation: intact to light touch bilateral LE   Motor: EHL/FHL/TA/GS 5/5 bilaterally       A/P: 63yFemale POD#1 s/p Right hip CRPP POD #1      - Medically stable, Hgb 8.4, suspect some dilution from IVF, BP stable today when OOB   - Pain Control: tramadol PRN   - DVT ppx: Start ASA 81mg BID x30 days, protonix GI ppx   - PT, WBS: WBAT with PT  - Dispo: optimized with PT for d/c home with home PT, patient agreeable to plan  - Recommended by Hospitalist to f/u with endocrinology for further w/u and management of osteoporosis- d/w patient and info provided in d/c paperwork    Ortho Pager [phone]
Ortho Note  Patietn seen and examined. Family at bedside  Pt comfortable without complaints, pain controlled  Denies CP, SOB, N/V, numbness/tingling     Vital Signs Last 24 Hrs  T(C): 36.7 (05-08-24 @ 19:22), Max: 36.7 (05-08-24 @ 19:22)  T(F): 98.1 (05-08-24 @ 19:22), Max: 98.1 (05-08-24 @ 19:22)  HR: 70 (05-08-24 @ 19:22) (58 - 70)  BP: 92/61 (05-08-24 @ 19:22) (92/61 - 118/66)  BP(mean): 79 (05-08-24 @ 18:25) (79 - 87)  RR: 18 (05-08-24 @ 19:22) (13 - 20)  SpO2: 96% (05-08-24 @ 19:22) (96% - 100%)  I&O's Summary    08 May 2024 07:01  -  08 May 2024 21:07  --------------------------------------------------------  IN: 700 mL / OUT: 0 mL / NET: 700 mL        General: Pt Alert and oriented, NAD  DSG C/D/I - aquacel  Pulses: 2+ DP  Sensation: SILT  Motor: EHL/FHL/TA/GS firing                          10.4   9.81  )-----------( 213      ( 08 May 2024 05:30 )             32.8     05-08    136  |  104  |  15  ----------------------------<  109<H>  3.9   |  25  |  0.65    Ca    9.0      08 May 2024 05:30        A/P: 63yFemale POD# s/p R CMN  - Stable  - Pain Control  - DVT ppx: SCDs  - PT, WBS: WBAT  Dispo: Pending PT eval    Ortho Pager [phone]
S: patient seen bedside POD 1. Feels very well, pain controlled, no dyspnea, comfortable. No urinary complaints, no breathing complaints. Tolerating PO. No BM yet but passing flatus.   ROS otherwise negative    Vital Signs Last 24 Hrs  T(C): 36.4 (09 May 2024 09:32), Max: 37.7 (08 May 2024 14:29)  T(F): 97.6 (09 May 2024 09:32), Max: 99.8 (08 May 2024 14:29)  HR: 82 (09 May 2024 09:32) (58 - 82)  BP: 100/67 (09 May 2024 09:32) (86/55 - 118/71)  BP(mean): 71 (09 May 2024 05:39) (65 - 87)  RR: 17 (09 May 2024 09:32) (13 - 20)  SpO2: 97% (09 May 2024 09:32) (96% - 100%)    Parameters below as of 09 May 2024 09:32  Patient On (Oxygen Delivery Method): room air      PE  Gen: pleasant female in NAD  HEENT: EOMI NCAT  Neck: no jvd no bruits  Lungs: CTA b/l nonlabored  CV: RRR S1S2  GI: +BS nontender  LE: no edema  Psych: calm cooperative.                           8.4    12.64 )-----------( 187      ( 09 May 2024 05:30 )             25.8   05-09    139  |  106  |  11  ----------------------------<  109<H>  3.8   |  23  |  0.58    Ca    8.2<L>      09 May 2024 05:30            
Ortho Note    Patient seen and examined at bedside this AM     Patient planned for Right hip CRPP today for right femoral neck fracture    Pt comfortable without complaints, pain controlled at rest  Denies CP, SOB, N/V, numbness/tingling     Vital Signs Last 24 Hrs  T(C): 36.9 (05-08-24 @ 09:02), Max: 36.9 (05-08-24 @ 09:02)  T(F): 98.4 (05-08-24 @ 09:02), Max: 98.4 (05-08-24 @ 09:02)  HR: 74 (05-08-24 @ 09:02) (74 - 74)  BP: 102/61 (05-08-24 @ 09:02) (102/61 - 102/61)  BP(mean): --  RR: 18 (05-08-24 @ 09:02) (18 - 18)  SpO2: 95% (05-08-24 @ 09:02) (95% - 95%)  AVSS    General: Pt Alert and oriented, NAD  bilateral lower extremities warm and well perfused   Sensation: intact to light touch bilateral LE   Motor: EHL/FHL/TA/GS 5/5 bilaterally         A/P: 63yFemale admitted with right hip femoral neck fracture    - Medically optimized for OR today for Right hip CRPP  - labs and vitals stable   - Pain Control: IV and PO PRN   - DVT ppx: SCDs, start DVT ppx post op   - PT, WBS: NWB RLE preop   - Dispo: for OR today     Ortho Pager [phone]

## 2024-05-09 NOTE — PROGRESS NOTE ADULT - ASSESSMENT
63yFemale with PMHx osteoporosis now s/p ground level fall with R  Hip pain found to have Acute right subcapital femoral neck fracture admitted to ortho for R hip Closed reduction with percutaneous pinning with Dr. Barnes on 5/8/24. Medicine consulted for pre-operative clearance    #s/p R CMN 5/8  -Management a/p primary  #pre-operative management  #Post operative state-  -Encourage IS. Ambulate/OOBTC as tolerated. PT consult. DVT ppx a/p primary. Multi modal pain control, can use IV dilaudid for severe/breakthrough pain.     #Acute blood loss anemia  -Preop labs hgb 11.7 down to 8.4 post operative  -No further s/s of blood loss  -Continue monitoring.     #osteoporosis  only home med alendronate 10mg daily   Ok to resume after. Will get her referral to osteoporosis clinic  Dietitian consult    #Low BP  Asymptomatic states her BP "runs low".     #Asx bacteriuria  -No symptoms. DO not recommend treating.       MDM High  Reviewed blood work, vitals, OR course, outpatient hospital records, hospital course.   Case d/w ortho ACP  Management of blood loss anemia, managmenet of pain requiring IV dilaudid.   63yFemale with PMHx osteoporosis now s/p ground level fall with R  Hip pain found to have Acute right subcapital femoral neck fracture admitted to ortho for R hip Closed reduction with percutaneous pinning with Dr. Barnes on 5/8/24. Medicine consulted for pre-operative clearance    #s/p R CMN 5/8  -Management a/p primary  #pre-operative management  #Post operative state-  -Encourage IS. Ambulate/OOBTC as tolerated. PT consult. DVT ppx a/p primary. Multi modal pain control, can use IV dilaudid for severe/breakthrough pain.     #Acute blood loss anemia  -Preop labs hgb 11.7 down to 8.4 post operative  -No further s/s of blood loss  -Continue monitoring.     #osteoporosis  only home med alendronate 10mg daily   Ok to resume after. Will get her referral to osteoporosis clinic  Dietitian consult    #Low BP  Asymptomatic states her BP "runs low".     #Asx bacteriuria  -No symptoms. DO not recommend treating.     #Leukocytosis  -Likely reactive in setting of trauma.   No s/s infxn. Improved. NTD.     MDM High  Reviewed blood work, vitals, OR course, outpatient hospital records, hospital course.   Case d/w ortho ACP  Management of blood loss anemia, managmenet of pain requiring IV dilaudid.

## 2024-05-09 NOTE — DISCHARGE NOTE NURSING/CASE MANAGEMENT/SOCIAL WORK - NSDCPEFALRISK_GEN_ALL_CORE
For information on Fall & Injury Prevention, visit: [email or web address] OR  [email or web address] OR  https://www.cdc.gov/steadi/patient.html

## 2024-05-09 NOTE — DISCHARGE NOTE PROVIDER - PROVIDER TOKENS
PROVIDER:[TOKEN:[[id number]],FOLLOWUP:[2 weeks]],PROVIDER:[TOKEN:[[id number]],FOLLOWUP:[Routine]]

## 2024-05-09 NOTE — OCCUPATIONAL THERAPY INITIAL EVALUATION ADULT - DIAGNOSIS, OT EVAL
Pt presents to [facility] following mechanical fall now s/p R hip CRPP POD 1. OT consulted and pt cleared for home no needs from OT perspective.

## 2024-05-09 NOTE — OCCUPATIONAL THERAPY INITIAL EVALUATION ADULT - GENERAL OBSERVATIONS, REHAB EVAL
Pt cleared by covering ORA Grubbs for OOB with OT. Pt received semisupine +incision c/d/i +hep lock +NAD; pt left seated EOB with all needs met and lines intact, RN aware.

## 2024-05-09 NOTE — DISCHARGE NOTE PROVIDER - CARE PROVIDERS DIRECT ADDRESSES
[email or web address]

## 2024-05-09 NOTE — OCCUPATIONAL THERAPY INITIAL EVALUATION ADULT - ADDITIONAL COMMENTS
Pt R handed and does not wear glasses. Pt lives in private house with multiple steps to enter with walk in shower in bathroom. Pt independent at baseline without need for DME or AD.

## 2024-05-09 NOTE — DISCHARGE NOTE NURSING/CASE MANAGEMENT/SOCIAL WORK - NSSCNAMETXT_GEN_ALL_CORE
[FreeTextEntry1] : 10/21/21\par unremarkable CBC w/ diff Formerly [facility] (formerly [facility])

## 2024-05-09 NOTE — DISCHARGE NOTE PROVIDER - CARE PROVIDER_API CALL
Marc Barnes  Orthopaedic Surgery  [address]  [place] [address]  Phone: [phone]  Fax: [phone]  Follow Up Time: 2 weeks    Marie Lawrence  Endocrinology/Metab/Diabetes  [address]  [place] [address]  Phone: [phone]  Fax: [phone]  Follow Up Time: Routine

## 2024-05-09 NOTE — DISCHARGE NOTE PROVIDER - NSDCFUADDINST_GEN_ALL_CORE_FT
s/p: Right hip closed reduction percutanous pinning     ACTIVITY:   - Weight bear as tolerated with assistive device. No strenuous activity, heavy lifting, driving or returning to work until cleared by MD.   - Apply a cold compress to the surgical site several times daily to reduce pain and swelling. For icing, twenty-minute sessions followed by an hour off is recommended. You should ice as frequently as possible. Ice should not be placed directly on the skin. Wearing compression stockings during the first week after surgery can help reduce swelling in your knee, calf and foot, but is not required.      DRESSING/SHOWERING:   (AQUACEL – brown gel dressing)   - You may shower, your dressing is water-resistant. Do not soak in bathtubs. Remove dressing after postop day 7, then leave incision open to air. You may do this yourself (simply peel it off), or you may ask for assistance from your visiting nurse. Keep your incision clean and dry. Do not pick at your incision. Do not apply creams, ointments or oils to your incision until cleared by your surgeon. Do not soak your incision in sitting water (ie tubs, pools, lakes, etc.) until cleared by your surgeon. Do not scrub the incision – instead, allow soap and water to flow over the incision and then pat it dry with a clean towel.     MEDICATION/ANTICOAGULATION:   -You have been prescribed Aspirin as a preventative to help prevent postoperative blood clots. Please take this medication as prescribed: 30 days post op   - You have been prescribed medications for pain:   - Tylenol for mild to moderate pain. Do not exceed 3,000mg daily.   - For more severe pain, take Tylenol with the addition of narcotic pain medication. Take this medication as prescribed. This medication may cause drowsiness or dizziness. Do not operate machinery. This medication may cause constipation.   - For any additional medications, follow instructions on the bottle.    -Try to have regular bowel movements. Take stool softener or laxative if necessary. You may wish to take Miralax daily until you have regular bowel movements.    - If you have been prescribed Aspirin or an anti-inflammatory, please take prilosec (omeprazole) once a day, before breakfast, until no longer taking Aspirin or anti-inflammatory. This will help protect your stomach.   - If you have a pain management physician, please follow-up with them postoperatively.    - If you experience any negative side effects of your medications, please call your surgeon's office to discuss.     FOLLOW-UP:   - Call to schedule an appt with Dr. Barnes for follow up.   - Please follow-up with your primary care physician or any other specialist you see postoperatively, if needed.    - Contact your doctor or go to the emergency room if you experience: fever greater than 101.5, chills, chest pain, difficulty breathing, redness or excessive drainage around the incision, other concerns.

## 2024-05-09 NOTE — PHYSICAL THERAPY INITIAL EVALUATION ADULT - PERTINENT HX OF CURRENT PROBLEM, REHAB EVAL
63yFemale with PMHx osteoporosis now s/p ground level fall with R  Hip pain.  Pt reports unable to bear weight on affected extremity afterwards. Denies any numbness/tingling. Denies any head trauma. Pt is a community ambulator at baseline, and uses nothing for assistance. Denies any antecedent groin pain. Now s/p  R hip FNF s/p R hip CRPP on 05-08

## 2024-05-09 NOTE — DISCHARGE NOTE PROVIDER - NSDCCPTREATMENT_GEN_ALL_CORE_FT
PRINCIPAL PROCEDURE  Procedure: Closed reduction of right hip with pininng  Findings and Treatment:

## 2024-05-09 NOTE — PHYSICAL THERAPY INITIAL EVALUATION ADULT - GENERAL OBSERVATIONS, REHAB EVAL
Patient received semi-khoury in bed  in NAD on RA, +SCDs, +PIV. Cleared by ORA Rodriguez. Agreeable to PT.

## 2024-05-09 NOTE — PHYSICAL THERAPY INITIAL EVALUATION ADULT - ADDITIONAL COMMENTS
Patient lives with spouse and daughter in private house with multiple steps to enter. Doesn't use any Assistive Devices at baseline. Denies recent Hx of falls.

## 2024-05-12 ENCOUNTER — NON-APPOINTMENT (OUTPATIENT)
Age: 64
End: 2024-05-12

## 2024-05-16 DIAGNOSIS — Z41.8 ENCOUNTER FOR OTHER PROCEDURES FOR PURPOSES OTHER THAN REMEDYING HEALTH STATE: ICD-10-CM

## 2024-05-16 DIAGNOSIS — M81.0 AGE-RELATED OSTEOPOROSIS WITHOUT CURRENT PATHOLOGICAL FRACTURE: ICD-10-CM

## 2024-05-16 DIAGNOSIS — D62 ACUTE POSTHEMORRHAGIC ANEMIA: ICD-10-CM
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
## 2024-05-16 DIAGNOSIS — S72.001A FRACTURE OF UNSPECIFIED PART OF NECK OF RIGHT FEMUR, INITIAL ENCOUNTER FOR CLOSED FRACTURE: ICD-10-CM

## 2024-05-16 DIAGNOSIS — R63.6 UNDERWEIGHT: ICD-10-CM

## 2024-05-16 DIAGNOSIS — I95.9 HYPOTENSION, UNSPECIFIED: ICD-10-CM
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
## 2024-05-31 PROBLEM — Z00.00 ENCOUNTER FOR PREVENTIVE HEALTH EXAMINATION: Status: ACTIVE | Noted: 2024-05-31

## 2024-07-10 ENCOUNTER — APPOINTMENT (OUTPATIENT)
Dept: ENDOCRINOLOGY | Facility: CLINIC | Age: 64
End: 2024-07-10
Payer: COMMERCIAL

## 2024-07-10 VITALS
HEART RATE: 79 BPM | DIASTOLIC BLOOD PRESSURE: 74 MMHG | HEIGHT: 63 IN | WEIGHT: 103 LBS | BODY MASS INDEX: 18.25 KG/M2 | SYSTOLIC BLOOD PRESSURE: 116 MMHG

## 2024-07-10 DIAGNOSIS — H53.9 UNSPECIFIED VISUAL DISTURBANCE: ICD-10-CM

## 2024-07-10 DIAGNOSIS — R73.03 PREDIABETES.: ICD-10-CM

## 2024-07-10 DIAGNOSIS — M81.0 AGE-RELATED OSTEOPOROSIS W/OUT CURRENT PATHOLOGICAL FRACTURE: ICD-10-CM

## 2024-07-10 PROCEDURE — 99204 OFFICE O/P NEW MOD 45 MIN: CPT | Mod: 25

## 2024-07-10 PROCEDURE — 36415 COLL VENOUS BLD VENIPUNCTURE: CPT

## 2024-07-11 LAB
25(OH)D3 SERPL-MCNC: 42 NG/ML
ALBUMIN SERPL ELPH-MCNC: 4.7 G/DL
ALP BLD-CCNC: 68 U/L
ANION GAP SERPL CALC-SCNC: 14 MMOL/L
BILIRUB SERPL-MCNC: 0.2 MG/DL
BUN SERPL-MCNC: 14 MG/DL
CALCIUM SERPL-MCNC: 9.8 MG/DL
CALCIUM SERPL-MCNC: 9.8 MG/DL
CHLORIDE SERPL-SCNC: 104 MMOL/L
CHOLEST SERPL-MCNC: 211 MG/DL
CO2 SERPL-SCNC: 23 MMOL/L
CREAT SERPL-MCNC: 0.76 MG/DL
EGFR: 87 ML/MIN/1.73M2
ESTIMATED AVERAGE GLUCOSE: 111 MG/DL
GLUCOSE SERPL-MCNC: 145 MG/DL
HBA1C MFR BLD HPLC: 5.5 %
HDLC SERPL-MCNC: 59 MG/DL
MAGNESIUM SERPL-MCNC: 2.3 MG/DL
NONHDLC SERPL-MCNC: 152 MG/DL
PARATHYROID HORMONE INTACT: 46 PG/ML
PHOSPHATE SERPL-MCNC: 3.5 MG/DL
POTASSIUM SERPL-SCNC: 4.1 MMOL/L
PROT SERPL-MCNC: 7.7 G/DL
SODIUM SERPL-SCNC: 141 MMOL/L
TRIGL SERPL-MCNC: 190 MG/DL
TSH SERPL-ACNC: 0.71 UIU/ML

## 2024-07-15 ENCOUNTER — NON-APPOINTMENT (OUTPATIENT)
Age: 64
End: 2024-07-15

## 2024-07-17 LAB
ALP BONE SERPL-MCNC: 8.9 UG/L
PROPEPTIDE TYPE I COLLAGEN: 25.4 NG/ML

## 2024-07-22 LAB
CAU: 2 MG/DL
CREAT 24H UR-MCNC: 0.6 G/24 H
CREAT ?TM UR-MCNC: 40 MG/DL
PROT ?TM UR-MCNC: 24 HR
SPECIMEN VOL 24H UR: 1400 ML
SPECIMEN VOL 24H UR: 28 MG/24 H

## 2024-09-16 ENCOUNTER — OUTPATIENT (OUTPATIENT)
Dept: OUTPATIENT SERVICES | Facility: HOSPITAL | Age: 64
LOS: 1 days | End: 2024-09-16
Payer: COMMERCIAL

## 2024-09-16 ENCOUNTER — APPOINTMENT (OUTPATIENT)
Dept: INFUSION THERAPY | Facility: CLINIC | Age: 64
End: 2024-09-16

## 2024-09-16 VITALS
DIASTOLIC BLOOD PRESSURE: 71 MMHG | TEMPERATURE: 98 F | RESPIRATION RATE: 18 BRPM | SYSTOLIC BLOOD PRESSURE: 100 MMHG | HEART RATE: 78 BPM | OXYGEN SATURATION: 99 %

## 2024-09-16 DIAGNOSIS — M81.0 AGE-RELATED OSTEOPOROSIS WITHOUT CURRENT PATHOLOGICAL FRACTURE: ICD-10-CM

## 2024-09-16 PROCEDURE — 96372 THER/PROPH/DIAG INJ SC/IM: CPT

## 2024-09-16 RX ORDER — ROMOSOZUMAB-AQQG 105 MG/1.17ML
105 INJECTION, SOLUTION SUBCUTANEOUS
Refills: 0 | Status: COMPLETED | OUTPATIENT
Start: 2024-09-16 | End: 2024-09-16

## 2024-09-16 RX ADMIN — ROMOSOZUMAB-AQQG 105 MILLIGRAM(S): 105 INJECTION, SOLUTION SUBCUTANEOUS at 13:28

## 2024-09-16 RX ADMIN — ROMOSOZUMAB-AQQG 105 MILLIGRAM(S): 105 INJECTION, SOLUTION SUBCUTANEOUS at 13:25

## 2024-10-16 ENCOUNTER — APPOINTMENT (OUTPATIENT)
Dept: INFUSION THERAPY | Facility: CLINIC | Age: 64
End: 2024-10-16

## 2024-10-16 ENCOUNTER — OUTPATIENT (OUTPATIENT)
Dept: OUTPATIENT SERVICES | Facility: HOSPITAL | Age: 64
LOS: 1 days | End: 2024-10-16
Payer: COMMERCIAL

## 2024-10-16 VITALS
WEIGHT: 102.07 LBS | RESPIRATION RATE: 16 BRPM | OXYGEN SATURATION: 100 % | DIASTOLIC BLOOD PRESSURE: 63 MMHG | SYSTOLIC BLOOD PRESSURE: 99 MMHG | HEART RATE: 67 BPM | TEMPERATURE: 98 F | HEIGHT: 63 IN

## 2024-10-16 DIAGNOSIS — M81.0 AGE-RELATED OSTEOPOROSIS WITHOUT CURRENT PATHOLOGICAL FRACTURE: ICD-10-CM

## 2024-10-16 PROCEDURE — 96372 THER/PROPH/DIAG INJ SC/IM: CPT

## 2024-10-16 RX ORDER — ROMOSOZUMAB-AQQG 105 MG/1.17ML
105 INJECTION, SOLUTION SUBCUTANEOUS
Refills: 0 | Status: COMPLETED | OUTPATIENT
Start: 2024-10-16 | End: 2024-10-16

## 2024-10-16 RX ADMIN — ROMOSOZUMAB-AQQG 105 MILLIGRAM(S): 105 INJECTION, SOLUTION SUBCUTANEOUS at 16:12

## 2024-10-16 RX ADMIN — ROMOSOZUMAB-AQQG 105 MILLIGRAM(S): 105 INJECTION, SOLUTION SUBCUTANEOUS at 16:13

## 2024-11-05 ENCOUNTER — NON-APPOINTMENT (OUTPATIENT)
Age: 64
End: 2024-11-05

## 2024-11-13 ENCOUNTER — APPOINTMENT (OUTPATIENT)
Dept: INFUSION THERAPY | Facility: CLINIC | Age: 64
End: 2024-11-13

## 2024-11-13 ENCOUNTER — OUTPATIENT (OUTPATIENT)
Dept: OUTPATIENT SERVICES | Facility: HOSPITAL | Age: 64
LOS: 1 days | End: 2024-11-13
Payer: MEDICARE

## 2024-11-13 VITALS
HEIGHT: 63 IN | WEIGHT: 103.84 LBS | HEART RATE: 78 BPM | TEMPERATURE: 97 F | SYSTOLIC BLOOD PRESSURE: 110 MMHG | OXYGEN SATURATION: 98 % | DIASTOLIC BLOOD PRESSURE: 71 MMHG | RESPIRATION RATE: 17 BRPM

## 2024-11-13 DIAGNOSIS — M81.0 AGE-RELATED OSTEOPOROSIS WITHOUT CURRENT PATHOLOGICAL FRACTURE: ICD-10-CM

## 2024-11-13 PROCEDURE — 96372 THER/PROPH/DIAG INJ SC/IM: CPT

## 2024-11-13 RX ORDER — ROMOSOZUMAB-AQQG 105 MG/1.17ML
105 INJECTION, SOLUTION SUBCUTANEOUS
Refills: 0 | Status: COMPLETED | OUTPATIENT
Start: 2024-11-13 | End: 2024-11-13

## 2024-11-13 RX ADMIN — ROMOSOZUMAB-AQQG 105 MILLIGRAM(S): 105 INJECTION, SOLUTION SUBCUTANEOUS at 16:09

## 2024-11-13 RX ADMIN — ROMOSOZUMAB-AQQG 105 MILLIGRAM(S): 105 INJECTION, SOLUTION SUBCUTANEOUS at 16:10

## 2024-11-14 ENCOUNTER — NON-APPOINTMENT (OUTPATIENT)
Age: 64
End: 2024-11-14

## 2024-12-11 ENCOUNTER — APPOINTMENT (OUTPATIENT)
Dept: INFUSION THERAPY | Facility: CLINIC | Age: 64
End: 2024-12-11

## 2024-12-11 ENCOUNTER — OUTPATIENT (OUTPATIENT)
Dept: OUTPATIENT SERVICES | Facility: HOSPITAL | Age: 64
LOS: 1 days | End: 2024-12-11
Payer: MEDICARE

## 2024-12-11 VITALS
HEIGHT: 63 IN | OXYGEN SATURATION: 96 % | TEMPERATURE: 98 F | RESPIRATION RATE: 16 BRPM | WEIGHT: 102.96 LBS | SYSTOLIC BLOOD PRESSURE: 97 MMHG | HEART RATE: 78 BPM | DIASTOLIC BLOOD PRESSURE: 63 MMHG

## 2024-12-11 DIAGNOSIS — M81.0 AGE-RELATED OSTEOPOROSIS WITHOUT CURRENT PATHOLOGICAL FRACTURE: ICD-10-CM

## 2024-12-11 PROCEDURE — 96372 THER/PROPH/DIAG INJ SC/IM: CPT

## 2024-12-11 RX ORDER — ROMOSOZUMAB-AQQG 105 MG/1.17ML
105 INJECTION, SOLUTION SUBCUTANEOUS
Refills: 0 | Status: COMPLETED | OUTPATIENT
Start: 2024-12-11 | End: 2024-12-11

## 2024-12-11 RX ADMIN — ROMOSOZUMAB-AQQG 105 MILLIGRAM(S): 105 INJECTION, SOLUTION SUBCUTANEOUS at 13:40

## 2024-12-11 RX ADMIN — ROMOSOZUMAB-AQQG 105 MILLIGRAM(S): 105 INJECTION, SOLUTION SUBCUTANEOUS at 13:41

## 2024-12-11 NOTE — DISCHARGE INSTRUCTIONS: GENERAL THERAPY - I ACKNOWLEDGE THAT I HAVE RECEIVED AND UNDERSTAND THE ABOVE INSTRUCTIONS AND AGREE TO BEING DISCHARGED TODAY
[facility]      Rooming information:  Approximate last use of full opioid agonist: 11/24/2022  Taking buprenorphine? Yes:  As prescribed? No  Number of buprenorphine films/tablets remaining currently: 0  Side effects related to buprenorphine (constipation, dry mouth, sedation?) Yes:  Dry mouth   Narcan currently available: Yes  Other recent substance use:    fentanyl   NICOTINE-Yes: vape   If using nicotine, ready to quit? Yes:     Point of care urine drug screen positive for: Amp, mAMP, THC     *[facility] urine drug screen does not screen for Fentanyl      PHQ Assesment Total Score(s) 11/9/2022   PHQ-9 Score 8   Some recent data might be hidden       If PHQ-9 score of 15 or higher, has [facility] therapist or provider been notified? No    Any current suicidal ideation? No  If yes, has [facility] therapist or provider been notified? Yes    Primary care provider: Physician No Ref-Primary     Mental health provider: denies  (follow up on MH referral if needed)    Insurance needs: active    Housing needs: stable     Contact information up to date? yes    3rd Party Involvement not at this time  (please obtain HUMPHREY if pt would like to include)    Kallie Joya MA  November 25, 2022  2:05 PM    
Statement Selected

## 2025-01-06 ENCOUNTER — NON-APPOINTMENT (OUTPATIENT)
Age: 65
End: 2025-01-06

## 2025-01-08 ENCOUNTER — APPOINTMENT (OUTPATIENT)
Dept: INFUSION THERAPY | Facility: CLINIC | Age: 65
End: 2025-01-08

## 2025-01-08 ENCOUNTER — OUTPATIENT (OUTPATIENT)
Dept: OUTPATIENT SERVICES | Facility: HOSPITAL | Age: 65
LOS: 1 days | End: 2025-01-08
Payer: MEDICARE

## 2025-01-08 DIAGNOSIS — M81.0 AGE-RELATED OSTEOPOROSIS WITHOUT CURRENT PATHOLOGICAL FRACTURE: ICD-10-CM

## 2025-01-08 PROCEDURE — 96372 THER/PROPH/DIAG INJ SC/IM: CPT

## 2025-01-08 RX ORDER — ROMOSOZUMAB-AQQG 105 MG/1.17ML
105 INJECTION, SOLUTION SUBCUTANEOUS
Refills: 0 | Status: COMPLETED | OUTPATIENT
Start: 2025-01-08 | End: 2025-01-08

## 2025-01-08 RX ADMIN — ROMOSOZUMAB-AQQG 105 MILLIGRAM(S): 105 INJECTION, SOLUTION SUBCUTANEOUS at 12:35

## 2025-01-08 RX ADMIN — ROMOSOZUMAB-AQQG 105 MILLIGRAM(S): 105 INJECTION, SOLUTION SUBCUTANEOUS at 12:38

## 2025-02-20 ENCOUNTER — NON-APPOINTMENT (OUTPATIENT)
Age: 65
End: 2025-02-20

## 2025-02-26 ENCOUNTER — OUTPATIENT (OUTPATIENT)
Dept: OUTPATIENT SERVICES | Facility: HOSPITAL | Age: 65
LOS: 1 days | End: 2025-02-26
Payer: MEDICARE

## 2025-02-26 ENCOUNTER — APPOINTMENT (OUTPATIENT)
Dept: INFUSION THERAPY | Facility: CLINIC | Age: 65
End: 2025-02-26

## 2025-02-26 VITALS
WEIGHT: 102.07 LBS | RESPIRATION RATE: 18 BRPM | SYSTOLIC BLOOD PRESSURE: 92 MMHG | OXYGEN SATURATION: 99 % | TEMPERATURE: 99 F | HEART RATE: 64 BPM | HEIGHT: 63 IN

## 2025-02-26 DIAGNOSIS — K50.90 CROHN'S DISEASE, UNSPECIFIED, WITHOUT COMPLICATIONS: ICD-10-CM

## 2025-02-26 PROCEDURE — 96372 THER/PROPH/DIAG INJ SC/IM: CPT

## 2025-02-26 RX ORDER — ROMOSOZUMAB-AQQG 105 MG/1.17ML
105 INJECTION, SOLUTION SUBCUTANEOUS
Refills: 0 | Status: COMPLETED | OUTPATIENT
Start: 2025-02-26 | End: 2025-02-26

## 2025-02-26 RX ADMIN — ROMOSOZUMAB-AQQG 105 MILLIGRAM(S): 105 INJECTION, SOLUTION SUBCUTANEOUS at 12:21

## 2025-02-26 RX ADMIN — ROMOSOZUMAB-AQQG 105 MILLIGRAM(S): 105 INJECTION, SOLUTION SUBCUTANEOUS at 12:20

## 2025-02-27 ENCOUNTER — LABORATORY RESULT (OUTPATIENT)
Age: 65
End: 2025-02-27

## 2025-02-27 ENCOUNTER — APPOINTMENT (OUTPATIENT)
Dept: ENDOCRINOLOGY | Facility: CLINIC | Age: 65
End: 2025-02-27
Payer: MEDICARE

## 2025-02-27 VITALS
WEIGHT: 103 LBS | SYSTOLIC BLOOD PRESSURE: 99 MMHG | DIASTOLIC BLOOD PRESSURE: 68 MMHG | BODY MASS INDEX: 18.25 KG/M2 | HEART RATE: 82 BPM

## 2025-02-27 DIAGNOSIS — H26.9 UNSPECIFIED CATARACT: ICD-10-CM

## 2025-02-27 DIAGNOSIS — Z80.0 FAMILY HISTORY OF MALIGNANT NEOPLASM OF DIGESTIVE ORGANS: ICD-10-CM

## 2025-02-27 DIAGNOSIS — Z80.8 FAMILY HISTORY OF MALIGNANT NEOPLASM OF OTHER ORGANS OR SYSTEMS: ICD-10-CM

## 2025-02-27 DIAGNOSIS — R73.03 PREDIABETES.: ICD-10-CM

## 2025-02-27 DIAGNOSIS — Z82.0 FAMILY HISTORY OF EPILEPSY AND OTHER DISEASES OF THE NERVOUS SYSTEM: ICD-10-CM

## 2025-02-27 DIAGNOSIS — M81.0 AGE-RELATED OSTEOPOROSIS W/OUT CURRENT PATHOLOGICAL FRACTURE: ICD-10-CM

## 2025-02-27 PROCEDURE — G2211 COMPLEX E/M VISIT ADD ON: CPT

## 2025-02-27 PROCEDURE — 36415 COLL VENOUS BLD VENIPUNCTURE: CPT

## 2025-02-27 PROCEDURE — 99205 OFFICE O/P NEW HI 60 MIN: CPT

## 2025-02-27 RX ORDER — METHYLDOPA/HYDROCHLOROTHIAZIDE 250MG-15MG
TABLET ORAL
Refills: 0 | Status: ACTIVE | COMMUNITY

## 2025-02-27 RX ORDER — PSYLLIUM HUSK 0.4 G
CAPSULE ORAL
Refills: 0 | Status: ACTIVE | COMMUNITY

## 2025-02-27 RX ORDER — ROMOSOZUMAB-AQQG 105 MG/1.17ML
INJECTION, SOLUTION SUBCUTANEOUS
Refills: 0 | Status: ACTIVE | COMMUNITY

## 2025-02-27 RX ORDER — CALCIUM CITRATE/VITAMIN D3 315MG-6.25
TABLET ORAL
Refills: 0 | Status: ACTIVE | COMMUNITY

## 2025-02-28 LAB
ALBUMIN SERPL ELPH-MCNC: 4.3 G/DL
ALP BLD-CCNC: 55 U/L
ALT SERPL-CCNC: 18 U/L
ANION GAP SERPL CALC-SCNC: 11 MMOL/L
AST SERPL-CCNC: 32 U/L
BILIRUB SERPL-MCNC: 0.6 MG/DL
BUN SERPL-MCNC: 17 MG/DL
CALCIUM SERPL-MCNC: 9.3 MG/DL
CALCIUM SERPL-MCNC: 9.3 MG/DL
CHLORIDE SERPL-SCNC: 105 MMOL/L
CO2 SERPL-SCNC: 24 MMOL/L
CREAT SERPL-MCNC: 0.76 MG/DL
EGFR: 87 ML/MIN/1.73M2
ESTIMATED AVERAGE GLUCOSE: 126 MG/DL
GLUCOSE SERPL-MCNC: 97 MG/DL
HBA1C MFR BLD HPLC: 6 %
MAGNESIUM SERPL-MCNC: 2.2 MG/DL
PARATHYROID HORMONE INTACT: 55 PG/ML
PHOSPHATE SERPL-MCNC: 4 MG/DL
POTASSIUM SERPL-SCNC: 4.4 MMOL/L
PROT SERPL-MCNC: 7.6 G/DL
SODIUM SERPL-SCNC: 141 MMOL/L

## 2025-03-03 LAB
ALP BONE SERPL-MCNC: 10.5 UG/L
TTG IGA SER IA-ACNC: <0.5 U/ML
TTG IGA SER-ACNC: NEGATIVE
TTG IGG SER IA-ACNC: <0.8 U/ML
TTG IGG SER IA-ACNC: NEGATIVE

## 2025-03-04 LAB — PROPEPTIDE TYPE I COLLAGEN: 43.1 NG/ML

## 2025-03-05 ENCOUNTER — TRANSCRIPTION ENCOUNTER (OUTPATIENT)
Age: 65
End: 2025-03-05

## 2025-03-05 LAB
ALBUMIN MFR SERPL ELPH: 58.4 %
ALBUMIN SERPL-MCNC: 4.4 G/DL
ALBUMIN/GLOB SERPL: 1.4 RATIO
ALPHA1 GLOB MFR SERPL ELPH: 3.4 %
ALPHA1 GLOB SERPL ELPH-MCNC: 0.3 G/DL
ALPHA2 GLOB MFR SERPL ELPH: 8.5 %
ALPHA2 GLOB SERPL ELPH-MCNC: 0.6 G/DL
B-GLOBULIN MFR SERPL ELPH: 9.5 %
B-GLOBULIN SERPL ELPH-MCNC: 0.7 G/DL
GAMMA GLOB FLD ELPH-MCNC: 1.5 G/DL
GAMMA GLOB MFR SERPL ELPH: 20.2 %
INTERPRETATION SERPL IEP-IMP: NORMAL
PROT SERPL-MCNC: 7.6 G/DL
PROT SERPL-MCNC: 7.6 G/DL

## 2025-03-06 ENCOUNTER — TRANSCRIPTION ENCOUNTER (OUTPATIENT)
Age: 65
End: 2025-03-06

## 2025-03-14 ENCOUNTER — NON-APPOINTMENT (OUTPATIENT)
Age: 65
End: 2025-03-14

## 2025-03-17 ENCOUNTER — TRANSCRIPTION ENCOUNTER (OUTPATIENT)
Age: 65
End: 2025-03-17

## 2025-03-18 ENCOUNTER — TRANSCRIPTION ENCOUNTER (OUTPATIENT)
Age: 65
End: 2025-03-18

## 2025-03-26 ENCOUNTER — APPOINTMENT (OUTPATIENT)
Dept: INFUSION THERAPY | Facility: CLINIC | Age: 65
End: 2025-03-26

## 2025-03-26 ENCOUNTER — OUTPATIENT (OUTPATIENT)
Dept: OUTPATIENT SERVICES | Facility: HOSPITAL | Age: 65
LOS: 1 days | End: 2025-03-26
Payer: MEDICARE

## 2025-03-26 VITALS
WEIGHT: 102.07 LBS | HEART RATE: 74 BPM | TEMPERATURE: 99 F | DIASTOLIC BLOOD PRESSURE: 81 MMHG | OXYGEN SATURATION: 98 % | RESPIRATION RATE: 17 BRPM | SYSTOLIC BLOOD PRESSURE: 117 MMHG | HEIGHT: 63.5 IN

## 2025-03-26 DIAGNOSIS — M81.0 AGE-RELATED OSTEOPOROSIS WITHOUT CURRENT PATHOLOGICAL FRACTURE: ICD-10-CM

## 2025-03-26 PROCEDURE — 96372 THER/PROPH/DIAG INJ SC/IM: CPT

## 2025-03-26 RX ORDER — ROMOSOZUMAB-AQQG 105 MG/1.17ML
105 INJECTION, SOLUTION SUBCUTANEOUS
Refills: 0 | Status: COMPLETED | OUTPATIENT
Start: 2025-03-26 | End: 2025-03-26

## 2025-03-26 RX ADMIN — ROMOSOZUMAB-AQQG 105 MILLIGRAM(S): 105 INJECTION, SOLUTION SUBCUTANEOUS at 12:26

## 2025-03-26 RX ADMIN — ROMOSOZUMAB-AQQG 105 MILLIGRAM(S): 105 INJECTION, SOLUTION SUBCUTANEOUS at 12:24

## 2025-03-26 NOTE — DISCHARGE INSTRUCTIONS: GENERAL THERAPY - DC SYMPTOM 2
29-Sep-2020 17:27
Episodes of uncontrolled nausea or vomiting not relieved by anti-nausea medication

## 2025-03-31 ENCOUNTER — TRANSCRIPTION ENCOUNTER (OUTPATIENT)
Age: 65
End: 2025-03-31

## 2025-04-23 ENCOUNTER — APPOINTMENT (OUTPATIENT)
Dept: RHEUMATOLOGY | Facility: CLINIC | Age: 65
End: 2025-04-23
Payer: MEDICARE

## 2025-04-23 ENCOUNTER — APPOINTMENT (OUTPATIENT)
Dept: INFUSION THERAPY | Facility: CLINIC | Age: 65
End: 2025-04-23

## 2025-04-23 VITALS
TEMPERATURE: 97.6 F | HEART RATE: 71 BPM | DIASTOLIC BLOOD PRESSURE: 71 MMHG | RESPIRATION RATE: 15 BRPM | OXYGEN SATURATION: 97 % | SYSTOLIC BLOOD PRESSURE: 105 MMHG

## 2025-04-23 DIAGNOSIS — M81.0 AGE-RELATED OSTEOPOROSIS W/OUT CURRENT PATHOLOGICAL FRACTURE: ICD-10-CM

## 2025-04-23 PROCEDURE — 96372 THER/PROPH/DIAG INJ SC/IM: CPT

## 2025-04-23 RX ORDER — ROMOSOZUMAB-AQQG 105 MG/1.17ML
105 INJECTION, SOLUTION SUBCUTANEOUS
Qty: 0 | Refills: 0 | Status: COMPLETED | OUTPATIENT
Start: 2025-04-17

## 2025-05-21 ENCOUNTER — APPOINTMENT (OUTPATIENT)
Dept: RHEUMATOLOGY | Facility: CLINIC | Age: 65
End: 2025-05-21
Payer: MEDICARE

## 2025-05-21 ENCOUNTER — APPOINTMENT (OUTPATIENT)
Dept: INFUSION THERAPY | Facility: CLINIC | Age: 65
End: 2025-05-21

## 2025-05-21 VITALS
HEART RATE: 69 BPM | RESPIRATION RATE: 15 BRPM | SYSTOLIC BLOOD PRESSURE: 127 MMHG | OXYGEN SATURATION: 98 % | TEMPERATURE: 98.8 F | DIASTOLIC BLOOD PRESSURE: 80 MMHG

## 2025-05-21 DIAGNOSIS — M81.0 AGE-RELATED OSTEOPOROSIS W/OUT CURRENT PATHOLOGICAL FRACTURE: ICD-10-CM

## 2025-05-21 PROCEDURE — 96372 THER/PROPH/DIAG INJ SC/IM: CPT

## 2025-05-21 RX ORDER — ROMOSOZUMAB-AQQG 105 MG/1.17ML
105 INJECTION, SOLUTION SUBCUTANEOUS
Qty: 0 | Refills: 0 | Status: COMPLETED | OUTPATIENT
Start: 2025-05-15

## 2025-06-18 ENCOUNTER — APPOINTMENT (OUTPATIENT)
Dept: INFUSION THERAPY | Facility: CLINIC | Age: 65
End: 2025-06-18

## 2025-06-25 ENCOUNTER — APPOINTMENT (OUTPATIENT)
Dept: RHEUMATOLOGY | Facility: CLINIC | Age: 65
End: 2025-06-25

## 2025-06-27 ENCOUNTER — APPOINTMENT (OUTPATIENT)
Dept: RHEUMATOLOGY | Facility: CLINIC | Age: 65
End: 2025-06-27
Payer: MEDICARE

## 2025-06-27 VITALS
RESPIRATION RATE: 15 BRPM | DIASTOLIC BLOOD PRESSURE: 79 MMHG | TEMPERATURE: 98.3 F | HEART RATE: 65 BPM | OXYGEN SATURATION: 99 % | SYSTOLIC BLOOD PRESSURE: 118 MMHG

## 2025-06-27 PROCEDURE — 96372 THER/PROPH/DIAG INJ SC/IM: CPT

## 2025-07-16 ENCOUNTER — APPOINTMENT (OUTPATIENT)
Dept: INFUSION THERAPY | Facility: CLINIC | Age: 65
End: 2025-07-16

## 2025-08-06 ENCOUNTER — APPOINTMENT (OUTPATIENT)
Dept: RHEUMATOLOGY | Facility: CLINIC | Age: 65
End: 2025-08-06
Payer: MEDICARE

## 2025-08-06 VITALS
RESPIRATION RATE: 14 BRPM | TEMPERATURE: 98.5 F | SYSTOLIC BLOOD PRESSURE: 100 MMHG | DIASTOLIC BLOOD PRESSURE: 67 MMHG | HEART RATE: 75 BPM | OXYGEN SATURATION: 97 %

## 2025-08-06 DIAGNOSIS — M81.0 AGE-RELATED OSTEOPOROSIS W/OUT CURRENT PATHOLOGICAL FRACTURE: ICD-10-CM

## 2025-08-06 PROCEDURE — 96372 THER/PROPH/DIAG INJ SC/IM: CPT

## 2025-08-13 ENCOUNTER — APPOINTMENT (OUTPATIENT)
Dept: INFUSION THERAPY | Facility: CLINIC | Age: 65
End: 2025-08-13

## 2025-09-03 ENCOUNTER — APPOINTMENT (OUTPATIENT)
Dept: RHEUMATOLOGY | Facility: CLINIC | Age: 65
End: 2025-09-03
Payer: MEDICARE

## 2025-09-03 VITALS
TEMPERATURE: 97.6 F | RESPIRATION RATE: 15 BRPM | OXYGEN SATURATION: 98 % | HEART RATE: 63 BPM | DIASTOLIC BLOOD PRESSURE: 74 MMHG | SYSTOLIC BLOOD PRESSURE: 110 MMHG

## 2025-09-03 DIAGNOSIS — M81.0 AGE-RELATED OSTEOPOROSIS W/OUT CURRENT PATHOLOGICAL FRACTURE: ICD-10-CM

## 2025-09-03 PROCEDURE — 96372 THER/PROPH/DIAG INJ SC/IM: CPT

## 2025-09-03 RX ORDER — ROMOSOZUMAB-AQQG 105 MG/1.17ML
105 INJECTION, SOLUTION SUBCUTANEOUS
Qty: 0 | Refills: 0 | Status: COMPLETED | OUTPATIENT
Start: 2025-08-26

## 2025-09-10 ENCOUNTER — APPOINTMENT (OUTPATIENT)
Dept: ENDOCRINOLOGY | Facility: CLINIC | Age: 65
End: 2025-09-10
Payer: MEDICARE

## 2025-09-10 VITALS
HEART RATE: 67 BPM | WEIGHT: 99 LBS | DIASTOLIC BLOOD PRESSURE: 76 MMHG | SYSTOLIC BLOOD PRESSURE: 119 MMHG | BODY MASS INDEX: 17.54 KG/M2

## 2025-09-10 DIAGNOSIS — R73.03 PREDIABETES.: ICD-10-CM

## 2025-09-10 PROCEDURE — G2211 COMPLEX E/M VISIT ADD ON: CPT

## 2025-09-10 PROCEDURE — 99214 OFFICE O/P EST MOD 30 MIN: CPT

## 2025-09-12 LAB
ALBUMIN SERPL ELPH-MCNC: 4.5 G/DL
ALP BLD-CCNC: 53 U/L
ALT SERPL-CCNC: 27 U/L
ANION GAP SERPL CALC-SCNC: 14 MMOL/L
AST SERPL-CCNC: 36 U/L
BILIRUB SERPL-MCNC: 0.3 MG/DL
BUN SERPL-MCNC: 17 MG/DL
CALCIUM SERPL-MCNC: 9.8 MG/DL
CALCIUM SERPL-MCNC: 9.8 MG/DL
CHLORIDE SERPL-SCNC: 103 MMOL/L
CO2 SERPL-SCNC: 23 MMOL/L
CREAT SERPL-MCNC: 0.75 MG/DL
EGFRCR SERPLBLD CKD-EPI 2021: 88 ML/MIN/1.73M2
ESTIMATED AVERAGE GLUCOSE: 123 MG/DL
GLUCOSE SERPL-MCNC: 180 MG/DL
HBA1C MFR BLD HPLC: 5.9 %
MAGNESIUM SERPL-MCNC: 2.2 MG/DL
PARATHYROID HORMONE INTACT: 59 PG/ML
PHOSPHATE SERPL-MCNC: 4.8 MG/DL
POTASSIUM SERPL-SCNC: 4.5 MMOL/L
PROT SERPL-MCNC: 7.6 G/DL
SODIUM SERPL-SCNC: 140 MMOL/L

## 2025-09-17 ENCOUNTER — TRANSCRIPTION ENCOUNTER (OUTPATIENT)
Age: 65
End: 2025-09-17

## (undated) DEVICE — WARMING BLANKET UPPER ADULT

## (undated) DEVICE — DRSG AQUACEL 3.5 X 6"

## (undated) DEVICE — DRILL BIT STRYKER ORTHO TRAUMA 3.2X300MM

## (undated) DEVICE — VENODYNE/SCD SLEEVE CALF MEDIUM

## (undated) DEVICE — PACK BASIC GOWN MAYO COVER

## (undated) DEVICE — DRILL BIT STRYKER CANN 4.9X240MM

## (undated) DEVICE — GLV 7 PROTEXIS (WHITE)

## (undated) DEVICE — SUT MONOCRYL 3-0 27" PS-2 UNDYED

## (undated) DEVICE — SUT VICRYL 2-0 27" CT-1 UNDYED

## (undated) DEVICE — PREP CHLORAPREP HI-LITE ORANGE 26ML

## (undated) DEVICE — SUT VICRYL 0 36" CT-1 UNDYED

## (undated) DEVICE — DRSG STERISTRIPS 0.5 X 4"